# Patient Record
Sex: MALE | Race: WHITE | NOT HISPANIC OR LATINO | Employment: FULL TIME | URBAN - NONMETROPOLITAN AREA
[De-identification: names, ages, dates, MRNs, and addresses within clinical notes are randomized per-mention and may not be internally consistent; named-entity substitution may affect disease eponyms.]

---

## 2023-05-18 ENCOUNTER — OFFICE VISIT (OUTPATIENT)
Dept: PRIMARY CARE | Facility: CLINIC | Age: 48
End: 2023-05-18
Payer: COMMERCIAL

## 2023-05-18 VITALS
SYSTOLIC BLOOD PRESSURE: 146 MMHG | RESPIRATION RATE: 18 BRPM | HEART RATE: 102 BPM | WEIGHT: 272 LBS | HEIGHT: 73 IN | BODY MASS INDEX: 36.05 KG/M2 | OXYGEN SATURATION: 96 % | DIASTOLIC BLOOD PRESSURE: 82 MMHG

## 2023-05-18 DIAGNOSIS — H66.90 ACUTE OTITIS MEDIA, UNSPECIFIED OTITIS MEDIA TYPE: Primary | ICD-10-CM

## 2023-05-18 PROCEDURE — 99212 OFFICE O/P EST SF 10 MIN: CPT | Performed by: NURSE PRACTITIONER

## 2023-05-18 PROCEDURE — 1036F TOBACCO NON-USER: CPT | Performed by: NURSE PRACTITIONER

## 2023-05-18 RX ORDER — ACETAMINOPHEN 500 MG
1 TABLET ORAL DAILY
COMMUNITY
Start: 2017-11-10

## 2023-05-18 RX ORDER — AMLODIPINE BESYLATE 10 MG/1
10 TABLET ORAL DAILY
COMMUNITY
End: 2023-06-09 | Stop reason: SDUPTHER

## 2023-05-18 RX ORDER — CIPROFLOXACIN AND DEXAMETHASONE 3; 1 MG/ML; MG/ML
4 SUSPENSION/ DROPS AURICULAR (OTIC) 2 TIMES DAILY
Qty: 2.8 ML | Refills: 0 | Status: SHIPPED | OUTPATIENT
Start: 2023-05-18 | End: 2023-05-25

## 2023-05-18 RX ORDER — AZITHROMYCIN 250 MG/1
TABLET, FILM COATED ORAL
Qty: 6 TABLET | Refills: 0 | Status: SHIPPED | OUTPATIENT
Start: 2023-05-18 | End: 2023-05-23

## 2023-05-18 RX ORDER — LISINOPRIL AND HYDROCHLOROTHIAZIDE 20; 25 MG/1; MG/1
1 TABLET ORAL DAILY
COMMUNITY
End: 2023-06-09 | Stop reason: SDUPTHER

## 2023-05-18 NOTE — PROGRESS NOTES
"Subjective   Patient ID: Jeyson Wen is a 48 y.o. male who presents for Earache (R ear x4 d ).    Started to get RT ear pain and pressure. Noted a hard sore area just under the RT ear \"on the jaw bone. Afebrile. Non smoker. No other symptoms    Earache          Review of Systems   HENT:  Positive for ear pain.    All other systems reviewed and are negative.      Objective   /82   Pulse 102   Resp 18   Ht 1.854 m (6' 1\")   Wt 123 kg (272 lb)   SpO2 96%   BMI 35.89 kg/m²     Physical Exam  Vitals reviewed.   HENT:      Head: Normocephalic and atraumatic.      Right Ear: Decreased hearing noted. Swelling and tenderness present. A middle ear effusion is present. There is mastoid tenderness.      Left Ear: No swelling or tenderness. A middle ear effusion is present.      Nose: No congestion.      Mouth/Throat:      Pharynx: No posterior oropharyngeal erythema.   Pulmonary:      Effort: Pulmonary effort is normal.      Breath sounds: Normal breath sounds.   Lymphadenopathy:      Cervical: Cervical adenopathy present.   Neurological:      Mental Status: He is alert.         Assessment/Plan        Jeyson was seen today for earache.  Diagnoses and all orders for this visit:  Acute otitis media, unspecified otitis media type (Primary)  Comments:  acute. start Zpak since alg to PCN. Cipro-dex 4 gtt BID x 7 days. call me if the painful LN does not resolve after atbx TX  Orders:  -     azithromycin (Zithromax) 250 mg tablet; Take 2 tablets (500 mg) by mouth once daily for 1 day, THEN 1 tablet (250 mg) once daily for 4 days. Take 2 tabs (500 mg) by mouth today, than 1 daily for 4 days..  -     ciprofloxacin-dexamethasone (CiproDEX) otic suspension; Administer 4 drops into each ear 2 times a day for 7 days.     "

## 2023-06-08 ENCOUNTER — TELEPHONE (OUTPATIENT)
Dept: PRIMARY CARE | Facility: CLINIC | Age: 48
End: 2023-06-08

## 2023-06-08 DIAGNOSIS — I15.9 SECONDARY HYPERTENSION: Primary | ICD-10-CM

## 2023-06-09 RX ORDER — LISINOPRIL AND HYDROCHLOROTHIAZIDE 20; 25 MG/1; MG/1
1 TABLET ORAL DAILY
Qty: 30 TABLET | Refills: 5 | Status: SHIPPED | OUTPATIENT
Start: 2023-06-09 | End: 2024-01-03 | Stop reason: SDUPTHER

## 2023-06-09 RX ORDER — AMLODIPINE BESYLATE 10 MG/1
10 TABLET ORAL DAILY
Qty: 30 TABLET | Refills: 5 | Status: SHIPPED | OUTPATIENT
Start: 2023-06-09 | End: 2024-01-12 | Stop reason: SDUPTHER

## 2023-07-03 ENCOUNTER — TELEPHONE (OUTPATIENT)
Dept: PRIMARY CARE | Facility: CLINIC | Age: 48
End: 2023-07-03
Payer: COMMERCIAL

## 2023-11-02 ENCOUNTER — APPOINTMENT (OUTPATIENT)
Dept: CARDIOLOGY | Facility: HOSPITAL | Age: 48
End: 2023-11-02
Payer: COMMERCIAL

## 2023-12-11 ASSESSMENT — ENCOUNTER SYMPTOMS
COUGH: 1
SORE THROAT: 1
NECK PAIN: 0
TROUBLE SWALLOWING: 0
SHORTNESS OF BREATH: 0
STRIDOR: 1
SWOLLEN GLANDS: 0
HOARSE VOICE: 1
ABDOMINAL PAIN: 0
VOMITING: 0
DIARRHEA: 0
HEADACHES: 0

## 2023-12-13 ENCOUNTER — OFFICE VISIT (OUTPATIENT)
Dept: PRIMARY CARE | Facility: CLINIC | Age: 48
End: 2023-12-13
Payer: COMMERCIAL

## 2023-12-13 ENCOUNTER — ANCILLARY PROCEDURE (OUTPATIENT)
Dept: RADIOLOGY | Facility: CLINIC | Age: 48
End: 2023-12-13
Payer: COMMERCIAL

## 2023-12-13 VITALS
TEMPERATURE: 97.8 F | SYSTOLIC BLOOD PRESSURE: 150 MMHG | WEIGHT: 260 LBS | OXYGEN SATURATION: 93 % | HEART RATE: 125 BPM | DIASTOLIC BLOOD PRESSURE: 90 MMHG | BODY MASS INDEX: 34.3 KG/M2

## 2023-12-13 DIAGNOSIS — J18.9 COMMUNITY ACQUIRED PNEUMONIA, UNSPECIFIED LATERALITY: Primary | ICD-10-CM

## 2023-12-13 DIAGNOSIS — J18.9 COMMUNITY ACQUIRED PNEUMONIA, UNSPECIFIED LATERALITY: ICD-10-CM

## 2023-12-13 PROBLEM — F41.9 ANXIETY: Status: ACTIVE | Noted: 2023-12-13

## 2023-12-13 PROBLEM — E55.9 VITAMIN D DEFICIENCY: Status: ACTIVE | Noted: 2023-12-13

## 2023-12-13 PROBLEM — K76.0 FATTY LIVER: Status: ACTIVE | Noted: 2023-12-13

## 2023-12-13 PROBLEM — M71.9 BURSITIS: Status: ACTIVE | Noted: 2023-12-13

## 2023-12-13 PROBLEM — M16.11 PRIMARY OSTEOARTHRITIS OF RIGHT HIP: Status: ACTIVE | Noted: 2017-01-10

## 2023-12-13 PROBLEM — R73.01 IFG (IMPAIRED FASTING GLUCOSE): Status: ACTIVE | Noted: 2023-12-13

## 2023-12-13 PROBLEM — Z96.649 S/P HIP REPLACEMENT: Status: ACTIVE | Noted: 2017-01-27

## 2023-12-13 PROBLEM — E66.9 OBESITY: Status: ACTIVE | Noted: 2017-01-10

## 2023-12-13 PROBLEM — R19.5 POSITIVE COLORECTAL CANCER SCREENING USING COLOGUARD TEST: Status: ACTIVE | Noted: 2023-12-13

## 2023-12-13 PROBLEM — Z96.642 HISTORY OF LEFT HIP REPLACEMENT: Status: ACTIVE | Noted: 2017-01-10

## 2023-12-13 PROBLEM — E78.5 HYPERLIPIDEMIA: Status: ACTIVE | Noted: 2023-12-13

## 2023-12-13 PROCEDURE — 1036F TOBACCO NON-USER: CPT

## 2023-12-13 PROCEDURE — 71046 X-RAY EXAM CHEST 2 VIEWS: CPT | Performed by: RADIOLOGY

## 2023-12-13 PROCEDURE — 71046 X-RAY EXAM CHEST 2 VIEWS: CPT

## 2023-12-13 PROCEDURE — 3080F DIAST BP >= 90 MM HG: CPT

## 2023-12-13 PROCEDURE — 99214 OFFICE O/P EST MOD 30 MIN: CPT

## 2023-12-13 PROCEDURE — 3077F SYST BP >= 140 MM HG: CPT

## 2023-12-13 RX ORDER — BENZONATATE 200 MG/1
200 CAPSULE ORAL 3 TIMES DAILY PRN
Qty: 30 CAPSULE | Refills: 0 | Status: SHIPPED | OUTPATIENT
Start: 2023-12-13 | End: 2023-12-23

## 2023-12-13 RX ORDER — AZITHROMYCIN 250 MG/1
TABLET, FILM COATED ORAL
Qty: 6 TABLET | Refills: 0 | Status: SHIPPED | OUTPATIENT
Start: 2023-12-13 | End: 2023-12-18

## 2023-12-13 RX ORDER — ROSUVASTATIN CALCIUM 5 MG/1
5 TABLET, COATED ORAL DAILY
COMMUNITY
Start: 2023-11-24 | End: 2023-12-27

## 2023-12-13 RX ORDER — ASPIRIN 81 MG/1
81 TABLET ORAL DAILY
COMMUNITY

## 2023-12-13 ASSESSMENT — ENCOUNTER SYMPTOMS
COUGH: 1
TROUBLE SWALLOWING: 0
OCCASIONAL FEELINGS OF UNSTEADINESS: 0
SORE THROAT: 1
SWOLLEN GLANDS: 0
NECK PAIN: 0
STRIDOR: 1
DIARRHEA: 0
DEPRESSION: 0
SHORTNESS OF BREATH: 0
HOARSE VOICE: 1
VOMITING: 0
LOSS OF SENSATION IN FEET: 0
ABDOMINAL PAIN: 0
HEADACHES: 0

## 2023-12-13 ASSESSMENT — SOCIAL DETERMINANTS OF HEALTH (SDOH)

## 2023-12-13 NOTE — PROGRESS NOTES
Subjective   Patient ID: Jeyson Wen is a 48 y.o. male who presents for Follow-up (Jeyson is here for an Urgent care follow up. Still has a cough and congestion. Blood pressure has been elevated due to the cold medication. Has been on Doxycyline for the past 5 days. ).  Patient presents for congestion, cough for a few weeks, and to establish care  Previously seen by Urgent care, he was started on 10 day dosing of Doxycycline, which patient finished full course, remains ill    Lungs with crackles bilaterally, differential CAP vs bronchitis  Start Azithro and obtain CXR    CHECKS BP DAILY  150S SYSTOLIC  TAKING MUCINEX AND CLARITIN  ON DAY 5 OF DOXY    NO FEVER, PRODUCTIVE COUGH WITH PURULENT SPUTUM  FATIGUE, NOT SLEEPING AT NIGHT    Sore Throat   This is a new problem. The current episode started 1 to 4 weeks ago. The problem has been unchanged. Neither side of throat is experiencing more pain than the other. There has been no fever. The pain is at a severity of 3/10. Associated symptoms include congestion, coughing, a hoarse voice, a plugged ear sensation and stridor. Pertinent negatives include no abdominal pain, diarrhea, drooling, ear discharge, ear pain, headaches, neck pain, shortness of breath, swollen glands, trouble swallowing or vomiting. He has had no exposure to strep or mono.       Vitals:    12/13/23 1012   BP: 150/90   Pulse: (!) 125   Temp: 36.6 °C (97.8 °F)   SpO2: 93%       Review of Systems   HENT:  Positive for congestion, hoarse voice and sore throat. Negative for drooling, ear discharge, ear pain and trouble swallowing.    Respiratory:  Positive for cough and stridor. Negative for shortness of breath.    Gastrointestinal:  Negative for abdominal pain, diarrhea and vomiting.   Musculoskeletal:  Negative for neck pain.   Neurological:  Negative for headaches.       Objective   Physical Exam  Vitals and nursing note reviewed.   Constitutional:       Appearance: He is ill-appearing.   Pulmonary:       Breath sounds: Wheezing, rhonchi and rales present.   Neurological:      Mental Status: He is alert.         Assessment/Plan   Problem List Items Addressed This Visit    None  Visit Diagnoses       Community acquired pneumonia, unspecified laterality    -  Primary    Relevant Medications    benzonatate (Tessalon) 200 mg capsule    azithromycin (Zithromax) 250 mg tablet    Other Relevant Orders    XR chest 2 views                 Thank you for coming in today, please call my office if you have any concerns or questions.     Dick LOPEZ, CNP

## 2023-12-15 ENCOUNTER — TELEPHONE (OUTPATIENT)
Dept: PRIMARY CARE | Facility: CLINIC | Age: 48
End: 2023-12-15
Payer: COMMERCIAL

## 2023-12-15 NOTE — TELEPHONE ENCOUNTER
----- Message from ROSSANA Winn sent at 12/15/2023  8:28 AM EST -----  Atypical vs viral pneumonia    Continue azithromycin and cough medicine. Notify the office if no improvement by Monday.

## 2023-12-15 NOTE — RESULT ENCOUNTER NOTE
Atypical vs viral pneumonia    Continue azithromycin and cough medicine. Notify the office if no improvement by Monday.

## 2023-12-23 DIAGNOSIS — E78.5 HYPERLIPIDEMIA, UNSPECIFIED HYPERLIPIDEMIA TYPE: ICD-10-CM

## 2023-12-27 RX ORDER — ROSUVASTATIN CALCIUM 5 MG/1
5 TABLET, COATED ORAL DAILY
Qty: 90 TABLET | Refills: 3 | Status: SHIPPED | OUTPATIENT
Start: 2023-12-27 | End: 2024-12-26

## 2024-01-03 ENCOUNTER — OFFICE VISIT (OUTPATIENT)
Dept: PRIMARY CARE | Facility: CLINIC | Age: 49
End: 2024-01-03
Payer: COMMERCIAL

## 2024-01-03 VITALS
OXYGEN SATURATION: 94 % | HEART RATE: 82 BPM | BODY MASS INDEX: 34.7 KG/M2 | WEIGHT: 263 LBS | SYSTOLIC BLOOD PRESSURE: 160 MMHG | DIASTOLIC BLOOD PRESSURE: 110 MMHG | TEMPERATURE: 97.3 F

## 2024-01-03 DIAGNOSIS — J18.9 COMMUNITY ACQUIRED PNEUMONIA, UNSPECIFIED LATERALITY: Primary | ICD-10-CM

## 2024-01-03 DIAGNOSIS — I15.9 SECONDARY HYPERTENSION: ICD-10-CM

## 2024-01-03 PROCEDURE — 3080F DIAST BP >= 90 MM HG: CPT

## 2024-01-03 PROCEDURE — 3077F SYST BP >= 140 MM HG: CPT

## 2024-01-03 PROCEDURE — 99214 OFFICE O/P EST MOD 30 MIN: CPT

## 2024-01-03 PROCEDURE — 1036F TOBACCO NON-USER: CPT

## 2024-01-03 RX ORDER — METHYLPREDNISOLONE 4 MG/1
TABLET ORAL
Qty: 21 TABLET | Refills: 0 | Status: SHIPPED | OUTPATIENT
Start: 2024-01-03 | End: 2024-01-10

## 2024-01-03 RX ORDER — ALBUTEROL SULFATE 90 UG/1
2 AEROSOL, METERED RESPIRATORY (INHALATION) EVERY 4 HOURS PRN
Qty: 8 G | Refills: 5 | Status: SHIPPED | OUTPATIENT
Start: 2024-01-03 | End: 2024-01-29 | Stop reason: SDUPTHER

## 2024-01-03 RX ORDER — LISINOPRIL AND HYDROCHLOROTHIAZIDE 20; 25 MG/1; MG/1
1 TABLET ORAL DAILY
Qty: 90 TABLET | Refills: 1 | Status: SHIPPED | OUTPATIENT
Start: 2024-01-03 | End: 2024-07-01

## 2024-01-03 RX ORDER — BENZONATATE 200 MG/1
200 CAPSULE ORAL 3 TIMES DAILY PRN
Qty: 30 CAPSULE | Refills: 0 | Status: SHIPPED | OUTPATIENT
Start: 2024-01-03 | End: 2024-01-13

## 2024-01-03 ASSESSMENT — ENCOUNTER SYMPTOMS
ACTIVITY CHANGE: 0
WHEEZING: 1
SHORTNESS OF BREATH: 0
COUGH: 1

## 2024-01-03 NOTE — PROGRESS NOTES
Subjective   Patient ID: Jeyson Wen is a 48 y.o. male who presents for Follow-up (Jeyson is here for a follow up of pneumonia. Does still have a cough and has phlegm sometimes, is like a yellow green. ).  Subjective  Jeyson Wen is a 48 y.o. male who presents for follow up on pneumonia. Symptoms include: pleuritic chest pain and coughing with wheezing. Symptoms began a few week ago, and have been gradually improving since that time. Patient denies chest pain. Treatment thus far includes: oral antibiotics per medication orders: somewhat effective. Past pulmonary history is significant for occasional episodes of bronchitis.    Objective  Oxygen saturation 94% on room air  [unfilled]     Assessment/Plan  Pneumonia.    Follow up as needed.  Add steroid and beta agonist inhaler        Vitals:    01/03/24 0903   BP: (!) 160/110   Pulse: 82   Temp: 36.3 °C (97.3 °F)   SpO2: 94%       Review of Systems   Constitutional:  Negative for activity change.   Respiratory:  Positive for cough and wheezing. Negative for shortness of breath.        Objective   Physical Exam  Vitals and nursing note reviewed.   Constitutional:       Appearance: Normal appearance.   HENT:      Head: Normocephalic.      Mouth/Throat:      Mouth: Mucous membranes are moist.   Cardiovascular:      Rate and Rhythm: Normal rate and regular rhythm.      Pulses: Normal pulses.      Heart sounds: Normal heart sounds. No murmur heard.     No friction rub. No gallop.   Pulmonary:      Effort: Pulmonary effort is normal. No respiratory distress.      Breath sounds: Wheezing present.      Comments: Small amount of expiratory wheeze  Abdominal:      General: Bowel sounds are normal. There is no distension.      Palpations: Abdomen is soft.      Tenderness: There is no abdominal tenderness.   Musculoskeletal:         General: No deformity. Normal range of motion.   Skin:     General: Skin is warm and dry.      Capillary Refill: Capillary refill takes less than  2 seconds.   Neurological:      General: No focal deficit present.      Mental Status: He is alert and oriented to person, place, and time.   Psychiatric:         Mood and Affect: Mood normal.         Assessment/Plan   Problem List Items Addressed This Visit    None  Visit Diagnoses       Community acquired pneumonia, unspecified laterality    -  Primary    Relevant Medications    methylPREDNISolone (Medrol Dospak) 4 mg tablets    albuterol (Ventolin HFA) 90 mcg/actuation inhaler    benzonatate (Tessalon) 200 mg capsule    Secondary hypertension        Relevant Medications    lisinopriL-hydrochlorothiazide 20-25 mg tablet                 Thank you for coming in today, please call my office if you have any concerns or questions.     Dick LOPEZ, CNP

## 2024-01-12 DIAGNOSIS — I15.9 SECONDARY HYPERTENSION: ICD-10-CM

## 2024-01-12 RX ORDER — AMLODIPINE BESYLATE 10 MG/1
10 TABLET ORAL DAILY
Qty: 30 TABLET | Refills: 5 | Status: SHIPPED | OUTPATIENT
Start: 2024-01-12 | End: 2024-01-30 | Stop reason: SDUPTHER

## 2024-01-29 ENCOUNTER — OFFICE VISIT (OUTPATIENT)
Dept: PRIMARY CARE | Facility: CLINIC | Age: 49
End: 2024-01-29
Payer: COMMERCIAL

## 2024-01-29 VITALS
TEMPERATURE: 97.5 F | BODY MASS INDEX: 34.96 KG/M2 | DIASTOLIC BLOOD PRESSURE: 90 MMHG | HEART RATE: 105 BPM | WEIGHT: 265 LBS | SYSTOLIC BLOOD PRESSURE: 148 MMHG | OXYGEN SATURATION: 95 %

## 2024-01-29 DIAGNOSIS — J18.9 COMMUNITY ACQUIRED PNEUMONIA, UNSPECIFIED LATERALITY: Primary | ICD-10-CM

## 2024-01-29 DIAGNOSIS — R06.2 WHEEZING: ICD-10-CM

## 2024-01-29 PROCEDURE — 99214 OFFICE O/P EST MOD 30 MIN: CPT

## 2024-01-29 PROCEDURE — 3077F SYST BP >= 140 MM HG: CPT

## 2024-01-29 PROCEDURE — 3080F DIAST BP >= 90 MM HG: CPT

## 2024-01-29 PROCEDURE — 1036F TOBACCO NON-USER: CPT

## 2024-01-29 RX ORDER — FLUTICASONE PROPIONATE 110 UG/1
1 AEROSOL, METERED RESPIRATORY (INHALATION)
Qty: 12 G | Refills: 5 | Status: SHIPPED | OUTPATIENT
Start: 2024-01-29 | End: 2024-01-30

## 2024-01-29 RX ORDER — ALBUTEROL SULFATE 90 UG/1
2 AEROSOL, METERED RESPIRATORY (INHALATION) EVERY 4 HOURS PRN
Qty: 8 G | Refills: 2 | Status: SHIPPED | OUTPATIENT
Start: 2024-01-29 | End: 2024-02-29 | Stop reason: SDUPTHER

## 2024-01-29 RX ORDER — LEVOFLOXACIN 500 MG/1
500 TABLET, FILM COATED ORAL DAILY
Qty: 7 TABLET | Refills: 0 | Status: SHIPPED | OUTPATIENT
Start: 2024-01-29 | End: 2024-02-05

## 2024-01-29 NOTE — PATIENT INSTRUCTIONS
Start Levaquin once daily for 7 days  Flovent steroid inhaler    Albuterol refill sent.   Thank you for coming in today, if any questions or concerns arise, please call my office.   JESSICA Winn-CNP

## 2024-01-29 NOTE — PROGRESS NOTES
Subjective   Patient ID: Jeyson Wen is a 48 y.o. male who presents for Follow-up (Jeyson is here for a follow up of pneumonia. He is still coughing a lot and wheezing. Did get better then came back again. ).  Subjective  Jeyson Wen is a 48 y.o. male who presents for follow up on pneumonia. Symptoms include: cough, sputum production, and wheezing. Symptoms began several weeks ago, and have been stabilized, worsened since that time. Patient denies dyspnea, chest pain, weight loss, or nausea and vomiting. Treatment thus far includes: abx, steroid, albuterol. Past pulmonary history is significant for no history of pneumonia or bronchitis.    Objective  Oxygen saturation 95% on room air  [unfilled]     Assessment/Plan  Community acquired pneumonia.    The diagnosis was discussed along with the usual course and treatment.  Educational material distributed.  Restarted antibiotics per medication orders.  OTC analgesics/antipyretics as needed.  Follow up as needed.          Vitals:    01/29/24 1512   BP: 148/90   Pulse: 105   Temp: 36.4 °C (97.5 °F)   SpO2: 95%       Review of Systems    Objective   Physical Exam    Assessment/Plan   Problem List Items Addressed This Visit    None  Visit Diagnoses       Community acquired pneumonia, unspecified laterality    -  Primary    Relevant Medications    levoFLOXacin (Levaquin) 500 mg tablet    albuterol (Ventolin HFA) 90 mcg/actuation inhaler    Other Relevant Orders    XR chest 2 views    Wheezing        Relevant Medications    fluticasone (Flovent) 110 mcg/actuation inhaler                 Thank you for coming in today, please call my office if you have any concerns or questions.     Dick LOPEZ, CNP

## 2024-01-30 ENCOUNTER — HOSPITAL ENCOUNTER (OUTPATIENT)
Dept: RADIOLOGY | Facility: CLINIC | Age: 49
Discharge: HOME | End: 2024-01-30
Payer: COMMERCIAL

## 2024-01-30 DIAGNOSIS — I15.9 SECONDARY HYPERTENSION: ICD-10-CM

## 2024-01-30 DIAGNOSIS — J18.9 COMMUNITY ACQUIRED PNEUMONIA, UNSPECIFIED LATERALITY: ICD-10-CM

## 2024-01-30 DIAGNOSIS — R06.2 WHEEZING: Primary | ICD-10-CM

## 2024-01-30 PROCEDURE — 71046 X-RAY EXAM CHEST 2 VIEWS: CPT

## 2024-01-30 PROCEDURE — 71046 X-RAY EXAM CHEST 2 VIEWS: CPT | Performed by: RADIOLOGY

## 2024-01-30 RX ORDER — BUDESONIDE 180 UG/1
1 AEROSOL, POWDER RESPIRATORY (INHALATION)
Qty: 1 EACH | Refills: 11 | Status: SHIPPED | OUTPATIENT
Start: 2024-01-30 | End: 2025-01-29

## 2024-01-30 RX ORDER — AMLODIPINE BESYLATE 10 MG/1
10 TABLET ORAL DAILY
Qty: 30 TABLET | Refills: 5 | Status: CANCELLED | OUTPATIENT
Start: 2024-01-30

## 2024-01-30 RX ORDER — AMLODIPINE BESYLATE 10 MG/1
10 TABLET ORAL DAILY
Qty: 90 TABLET | Refills: 1 | Status: SHIPPED | OUTPATIENT
Start: 2024-01-30 | End: 2024-02-29 | Stop reason: SDUPTHER

## 2024-01-30 NOTE — LETTER
February 1, 2024     Jeyson Vázqueznasir  [address]  [facility] [address]      Dear Mr. Wen:    Below are the results from your recent visit:    Resulted Orders   XR chest 2 views    Narrative    Interpreted By:  Scott Monte,   STUDY:  XR CHEST 2 VIEWS;  1/30/2024 3:19 pm      INDICATION:  Signs/Symptoms:continued wheezing, pneumonia follow up.      COMPARISON:  12/13/2023      ACCESSION NUMBER(S):  [id number]      ORDERING CLINICIAN:  MARIA E BELL      FINDINGS:  CHEST/LUNGS:  The cardiac and mediastinal silhouettes are unchanged in size and  configuration. No focal areas of consolidation are noted. No effusion  or pneumothorax is seen.      UPPER ABDOMEN:  No remarkable upper abdominal findings.      OSSEOUS STRUCTURES:  No acute changes.        Impression    No radiographic evidence of an acute cardiopulmonary process.      MACRO:  None.      Signed by: Scott Monte 1/31/2024 6:07 PM  Dictation workstation:   [id number]     The test results came back negative. Please continue current treatment plan as discussed at appointment.     If you have any questions or concerns, please don't hesitate to call.         Sincerely,    KATIA Castillo

## 2024-02-01 ENCOUNTER — TELEPHONE (OUTPATIENT)
Dept: PRIMARY CARE | Facility: CLINIC | Age: 49
End: 2024-02-01
Payer: COMMERCIAL

## 2024-02-01 NOTE — TELEPHONE ENCOUNTER
----- Message from JESSICA Winn-CNP sent at 2/1/2024  8:05 AM EST -----  Results are normal, please notify the patient. No signs of further pneumonia. Continue therapy as prescribed. Thanks

## 2024-02-01 NOTE — RESULT ENCOUNTER NOTE
Results are normal, please notify the patient. No signs of further pneumonia. Continue therapy as prescribed. Thanks

## 2024-02-29 ENCOUNTER — OFFICE VISIT (OUTPATIENT)
Dept: PRIMARY CARE | Facility: CLINIC | Age: 49
End: 2024-02-29
Payer: COMMERCIAL

## 2024-02-29 VITALS
DIASTOLIC BLOOD PRESSURE: 88 MMHG | HEART RATE: 93 BPM | OXYGEN SATURATION: 97 % | SYSTOLIC BLOOD PRESSURE: 148 MMHG | TEMPERATURE: 96.9 F | BODY MASS INDEX: 35.36 KG/M2 | WEIGHT: 268 LBS

## 2024-02-29 DIAGNOSIS — J18.9 COMMUNITY ACQUIRED PNEUMONIA, UNSPECIFIED LATERALITY: ICD-10-CM

## 2024-02-29 DIAGNOSIS — I15.9 SECONDARY HYPERTENSION: ICD-10-CM

## 2024-02-29 DIAGNOSIS — R06.2 WHEEZING: Primary | ICD-10-CM

## 2024-02-29 DIAGNOSIS — R29.818 SUSPECTED SLEEP APNEA: ICD-10-CM

## 2024-02-29 PROCEDURE — 1036F TOBACCO NON-USER: CPT

## 2024-02-29 PROCEDURE — 3077F SYST BP >= 140 MM HG: CPT

## 2024-02-29 PROCEDURE — 99213 OFFICE O/P EST LOW 20 MIN: CPT

## 2024-02-29 PROCEDURE — 3079F DIAST BP 80-89 MM HG: CPT

## 2024-02-29 RX ORDER — AMLODIPINE BESYLATE 10 MG/1
10 TABLET ORAL DAILY
Qty: 90 TABLET | Refills: 1 | Status: SHIPPED | OUTPATIENT
Start: 2024-02-29 | End: 2024-08-27

## 2024-02-29 RX ORDER — ALBUTEROL SULFATE 90 UG/1
2 AEROSOL, METERED RESPIRATORY (INHALATION) EVERY 4 HOURS PRN
Qty: 8 G | Refills: 2 | Status: SHIPPED | OUTPATIENT
Start: 2024-02-29 | End: 2025-02-28

## 2024-02-29 ASSESSMENT — ENCOUNTER SYMPTOMS
COUGH: 1
WHEEZING: 1
SHORTNESS OF BREATH: 0

## 2024-02-29 NOTE — PROGRESS NOTES
Subjective   Patient ID: Jeyson Wen is a 48 y.o. male who presents for Follow-up (Jeyson is here for a follow up of PNX. Is still having some wheezing. Needs refills sent to new pharmacy).    Wheezing  --Exercise not affected, Elliptical about 30 minutes, treadmill 25 minutes.   --Wheezing at night when lying for bed, takes albuterol before bed    See Johanna Reddy  Snores at night.     --PFT and Sleep study        Vitals:    02/29/24 1435   BP: 148/88   Pulse: 93   Temp: 36.1 °C (96.9 °F)   SpO2: 97%       Review of Systems   Respiratory:  Positive for cough and wheezing. Negative for shortness of breath.    All other systems reviewed and are negative.      Objective   Physical Exam  Vitals and nursing note reviewed.   Pulmonary:      Breath sounds: No wheezing.         Assessment/Plan   Problem List Items Addressed This Visit    None  Visit Diagnoses       Wheezing    -  Primary    Relevant Orders    Referral to Pulmonology    Complete Pulmonary Function Test Pre/Post Bronchodialator (Spirometry Pre/Post/DLCO/Lung Volumes)    Community acquired pneumonia, unspecified laterality        Relevant Medications    albuterol (Ventolin HFA) 90 mcg/actuation inhaler    Secondary hypertension        Relevant Medications    amLODIPine (Norvasc) 10 mg tablet    Suspected sleep apnea        Relevant Orders    In-Center Sleep Study (Non-Sleep Provider Only)                 Thank you for coming in today, please call my office if you have any concerns or questions.     Dick LOPEZ, CNP

## 2024-03-03 ENCOUNTER — APPOINTMENT (OUTPATIENT)
Dept: CARDIOLOGY | Facility: HOSPITAL | Age: 49
End: 2024-03-03
Payer: COMMERCIAL

## 2024-03-03 ENCOUNTER — HOSPITAL ENCOUNTER (EMERGENCY)
Facility: HOSPITAL | Age: 49
Discharge: HOME | End: 2024-03-04
Attending: STUDENT IN AN ORGANIZED HEALTH CARE EDUCATION/TRAINING PROGRAM
Payer: COMMERCIAL

## 2024-03-03 DIAGNOSIS — I10 HYPERTENSION, UNSPECIFIED TYPE: Primary | ICD-10-CM

## 2024-03-03 PROCEDURE — 93005 ELECTROCARDIOGRAM TRACING: CPT

## 2024-03-03 PROCEDURE — 99284 EMERGENCY DEPT VISIT MOD MDM: CPT | Mod: 25

## 2024-03-03 ASSESSMENT — PAIN SCALES - GENERAL: PAINLEVEL_OUTOF10: 0 - NO PAIN

## 2024-03-03 ASSESSMENT — COLUMBIA-SUICIDE SEVERITY RATING SCALE - C-SSRS
6. HAVE YOU EVER DONE ANYTHING, STARTED TO DO ANYTHING, OR PREPARED TO DO ANYTHING TO END YOUR LIFE?: NO
2. HAVE YOU ACTUALLY HAD ANY THOUGHTS OF KILLING YOURSELF?: NO
1. IN THE PAST MONTH, HAVE YOU WISHED YOU WERE DEAD OR WISHED YOU COULD GO TO SLEEP AND NOT WAKE UP?: NO

## 2024-03-03 ASSESSMENT — PAIN - FUNCTIONAL ASSESSMENT: PAIN_FUNCTIONAL_ASSESSMENT: 0-10

## 2024-03-04 ENCOUNTER — HOSPITAL ENCOUNTER (OUTPATIENT)
Dept: RESPIRATORY THERAPY | Facility: HOSPITAL | Age: 49
End: 2024-03-04
Payer: COMMERCIAL

## 2024-03-04 ENCOUNTER — OFFICE VISIT (OUTPATIENT)
Dept: PRIMARY CARE | Facility: CLINIC | Age: 49
End: 2024-03-04
Payer: COMMERCIAL

## 2024-03-04 ENCOUNTER — HOSPITAL ENCOUNTER (EMERGENCY)
Facility: HOSPITAL | Age: 49
Discharge: HOME | End: 2024-03-04
Attending: EMERGENCY MEDICINE
Payer: COMMERCIAL

## 2024-03-04 VITALS
SYSTOLIC BLOOD PRESSURE: 166 MMHG | BODY MASS INDEX: 35.89 KG/M2 | DIASTOLIC BLOOD PRESSURE: 101 MMHG | HEART RATE: 84 BPM | RESPIRATION RATE: 16 BRPM | WEIGHT: 265 LBS | TEMPERATURE: 97.2 F | OXYGEN SATURATION: 96 % | HEIGHT: 72 IN

## 2024-03-04 VITALS
WEIGHT: 265 LBS | HEART RATE: 102 BPM | BODY MASS INDEX: 35.94 KG/M2 | DIASTOLIC BLOOD PRESSURE: 110 MMHG | SYSTOLIC BLOOD PRESSURE: 170 MMHG | OXYGEN SATURATION: 95 % | TEMPERATURE: 97.5 F

## 2024-03-04 VITALS
HEART RATE: 70 BPM | HEIGHT: 72 IN | WEIGHT: 265 LBS | DIASTOLIC BLOOD PRESSURE: 100 MMHG | SYSTOLIC BLOOD PRESSURE: 147 MMHG | OXYGEN SATURATION: 95 % | TEMPERATURE: 98.2 F | BODY MASS INDEX: 35.89 KG/M2 | RESPIRATION RATE: 16 BRPM

## 2024-03-04 DIAGNOSIS — I10 ASYMPTOMATIC HYPERTENSION: Primary | ICD-10-CM

## 2024-03-04 DIAGNOSIS — I10 BENIGN ESSENTIAL HYPERTENSION: Primary | ICD-10-CM

## 2024-03-04 DIAGNOSIS — F41.9 ANXIETY: ICD-10-CM

## 2024-03-04 PROCEDURE — 2500000004 HC RX 250 GENERAL PHARMACY W/ HCPCS (ALT 636 FOR OP/ED): Performed by: STUDENT IN AN ORGANIZED HEALTH CARE EDUCATION/TRAINING PROGRAM

## 2024-03-04 PROCEDURE — 2500000001 HC RX 250 WO HCPCS SELF ADMINISTERED DRUGS (ALT 637 FOR MEDICARE OP)

## 2024-03-04 PROCEDURE — 99283 EMERGENCY DEPT VISIT LOW MDM: CPT

## 2024-03-04 PROCEDURE — 99213 OFFICE O/P EST LOW 20 MIN: CPT

## 2024-03-04 PROCEDURE — 3077F SYST BP >= 140 MM HG: CPT

## 2024-03-04 PROCEDURE — 3080F DIAST BP >= 90 MM HG: CPT

## 2024-03-04 PROCEDURE — 96374 THER/PROPH/DIAG INJ IV PUSH: CPT

## 2024-03-04 PROCEDURE — 99284 EMERGENCY DEPT VISIT MOD MDM: CPT | Performed by: EMERGENCY MEDICINE

## 2024-03-04 PROCEDURE — 1036F TOBACCO NON-USER: CPT

## 2024-03-04 RX ORDER — LORAZEPAM 0.5 MG/1
0.5 TABLET ORAL 3 TIMES DAILY PRN
Qty: 30 TABLET | Refills: 0 | Status: SHIPPED | OUTPATIENT
Start: 2024-03-04 | End: 2024-03-14

## 2024-03-04 RX ORDER — LABETALOL HYDROCHLORIDE 5 MG/ML
10 INJECTION, SOLUTION INTRAVENOUS ONCE
Status: COMPLETED | OUTPATIENT
Start: 2024-03-04 | End: 2024-03-04

## 2024-03-04 RX ORDER — HYDROXYZINE HYDROCHLORIDE 25 MG/1
25 TABLET, FILM COATED ORAL ONCE
Status: DISCONTINUED | OUTPATIENT
Start: 2024-03-04 | End: 2024-03-04

## 2024-03-04 RX ORDER — HYDROXYZINE HYDROCHLORIDE 25 MG/1
25 TABLET, FILM COATED ORAL AS NEEDED
Qty: 12 TABLET | Refills: 0 | Status: SHIPPED | OUTPATIENT
Start: 2024-03-04 | End: 2024-03-04 | Stop reason: SDUPTHER

## 2024-03-04 RX ORDER — HYDROXYZINE HYDROCHLORIDE 25 MG/1
25 TABLET, FILM COATED ORAL EVERY 6 HOURS PRN
Qty: 90 TABLET | Refills: 0 | Status: SHIPPED | OUTPATIENT
Start: 2024-03-04 | End: 2024-04-01

## 2024-03-04 RX ORDER — HYDROXYZINE HYDROCHLORIDE 10 MG/1
10 TABLET, FILM COATED ORAL ONCE
Status: COMPLETED | OUTPATIENT
Start: 2024-03-04 | End: 2024-03-04

## 2024-03-04 RX ORDER — CARVEDILOL 6.25 MG/1
6.25 TABLET ORAL
Qty: 60 TABLET | Refills: 0 | Status: SHIPPED | OUTPATIENT
Start: 2024-03-04 | End: 2024-04-03 | Stop reason: SDUPTHER

## 2024-03-04 RX ADMIN — HYDROXYZINE HYDROCHLORIDE 10 MG: 10 TABLET ORAL at 12:29

## 2024-03-04 RX ADMIN — LABETALOL HYDROCHLORIDE 10 MG: 5 INJECTION, SOLUTION INTRAVENOUS at 00:56

## 2024-03-04 ASSESSMENT — COLUMBIA-SUICIDE SEVERITY RATING SCALE - C-SSRS
2. HAVE YOU ACTUALLY HAD ANY THOUGHTS OF KILLING YOURSELF?: NO
6. HAVE YOU EVER DONE ANYTHING, STARTED TO DO ANYTHING, OR PREPARED TO DO ANYTHING TO END YOUR LIFE?: NO
1. IN THE PAST MONTH, HAVE YOU WISHED YOU WERE DEAD OR WISHED YOU COULD GO TO SLEEP AND NOT WAKE UP?: NO

## 2024-03-04 ASSESSMENT — ENCOUNTER SYMPTOMS
ACTIVITY CHANGE: 0
SHORTNESS OF BREATH: 0
SLEEP DISTURBANCE: 0
PALPITATIONS: 0
STRIDOR: 0
WHEEZING: 0
NERVOUS/ANXIOUS: 1

## 2024-03-04 ASSESSMENT — PAIN - FUNCTIONAL ASSESSMENT: PAIN_FUNCTIONAL_ASSESSMENT: 0-10

## 2024-03-04 ASSESSMENT — PAIN SCALES - GENERAL: PAINLEVEL_OUTOF10: 0 - NO PAIN

## 2024-03-04 NOTE — ED TRIAGE NOTES
Patient presents to the Emergency department with a chief complaint of hypertension. Patient has history of hypetension, is medication compliant, and was seen yesterday for hypertension at [facility]. Patient states his blood pressure was still high at home this morining, denies any symptoms

## 2024-03-04 NOTE — PROGRESS NOTES
Subjective   Patient ID: Jeyson Wen is a 48 y.o. male who presents for Follow-up (Jeyson is here for an ER follow up. Went into the ER and was in the 180S/100S. Was given something for blood pressure and slowly did decrease until the morning. Went to ER this morning and was advised could just be stress. ).  Hypertension  Patient is here for follow-up of elevated blood pressure. He is exercising and is adherent to a low-salt diet. Blood pressure is not well controlled at home. Cardiac symptoms: none. Patient denies chest pain, chest pressure/discomfort, dyspnea, irregular heart beat, orthopnea, and paroxysmal nocturnal dyspnea. Cardiovascular risk factors: hypertension and male gender. Use of agents associated with hypertension: none. History of target organ damage: none.    No symptoms, doing well otherwise  Checks BP once every other day    ER twice for high BP  Does have warm sensation.     Coreg regimen started.     Wheezing has resolved since his last visit.        Vitals:    03/04/24 1554   BP: (!) 170/110   Pulse: 102   Temp: 36.4 °C (97.5 °F)   SpO2: 95%       Review of Systems   Constitutional:  Negative for activity change.   Respiratory:  Negative for shortness of breath, wheezing and stridor.    Cardiovascular:  Negative for chest pain, palpitations and leg swelling.   Psychiatric/Behavioral:  Negative for sleep disturbance. The patient is nervous/anxious.    All other systems reviewed and are negative.      Objective   Physical Exam  Vitals and nursing note reviewed.   Constitutional:       Appearance: Normal appearance.   HENT:      Head: Normocephalic.      Mouth/Throat:      Mouth: Mucous membranes are moist.   Cardiovascular:      Rate and Rhythm: Normal rate and regular rhythm.      Pulses: Normal pulses.      Heart sounds: Normal heart sounds. No murmur heard.     No friction rub. No gallop.   Pulmonary:      Effort: Pulmonary effort is normal. No respiratory distress.      Breath sounds: Normal  breath sounds. No wheezing.   Abdominal:      General: Bowel sounds are normal. There is no distension.      Palpations: Abdomen is soft.      Tenderness: There is no abdominal tenderness.   Musculoskeletal:         General: No deformity. Normal range of motion.   Skin:     General: Skin is warm and dry.      Capillary Refill: Capillary refill takes less than 2 seconds.   Neurological:      General: No focal deficit present.      Mental Status: He is alert and oriented to person, place, and time.   Psychiatric:         Mood and Affect: Mood normal.         Assessment/Plan   Problem List Items Addressed This Visit       Benign essential hypertension - Primary    Relevant Medications    carvedilol (Coreg) 6.25 mg tablet    Anxiety    Relevant Medications    hydrOXYzine HCL (Atarax) 25 mg tablet    LORazepam (Ativan) 0.5 mg tablet            Thank you for coming in today, please call my office if you have any concerns or questions.     Dick LOPEZ, CNP

## 2024-03-04 NOTE — ED PROVIDER NOTES
History/Exam limitations: none  HPI was provided by patient    HPI:    Chief Complaint   Patient presents with    Hypertension     Running 180/100 at home 2 hrs ago.  Normally 130/80.        Jeyson Wen is a 48 y.o. male presents with chief complaint of hypertension.  Patient has a history of hypertension.  States at home 2 hours ago his blood pressure was running 180s over 100.  States it is normally 130/80.  Upon arrival here initial blood pressure is 185/101.  Presently asymptomatic.  No chest pain no cough wheeze or short of breath or numbness tingling or weakness.  No missed doses or recent changes in medication.  The wheezing at times but states he has had this since having pneumonia months ago and scheduled tomorrow to have pulmonary function test be performed.        ROS: All other review of systems are negative except as noted above and HPI or ROS.   CONSTITUTIONAL:       fever, chills  EYES:      Blurry vision, change in vision  ENT:       sore throat, congestion, rhinorrhea  CARDIOVASCULAR:       chest pain, palpitations, swelling  RESPIRATORY:       cough, wheeze, shortness of breath  GI:       nausea, vomiting, diarrhea, abdominal pain  GENITOURINARY:       dysuria, hematuria, frequency  MUSCULOSKELETAL:       deformity, neck pain  SKIN:       rash, lesion  NEUROLOGIC:       headache, numbness, focal weakness  ENDOCRINE:      weight loss, fatigue  NOTES: All systems reviewed, negative except as described above     Physical Exam:  GENERAL: Alert, cooperative,  in no acute distress.  HEAD: normocephalic, atraumatic  SKIN:  dry skin, no lesions.  EYES: PERRL, EOMs intact,  Conjunctiva pink with no erythema or exudates. No scleral icterus.    NECK: Supple, without meningismus. Trachea at midline. No lymphadenopathy.  PULMONARY: Clear bilaterally. No crackles, rhonchi, wheezing.  No respiratory distress.  No work of breathing.  CARDIAC: Regular rate and regular rhythm.  Pulses 2+ in radials and dorsal  pedal pulses bilaterally.  No murmur, rub, gallop.  No edema.  ABDOMEN: Soft, nontender, active bowel sounds.  No palpable organomegaly.  No rebound or guarding.  No CVA tenderness.  No pulsatile masses.  MUSCULOSKELETAL: Full range of motion throughout, no deformity.   NEUROLOGICAL: no focal neuro deficits.  Strength 5 out of 5 throughout bilateral upper and lower extremities neurovascular intact in bilateral upper and lower extremities  PSYCHIATRIC: Appropriate mood and affect. Calm.       MDM/ED COURSE:    The patient presented for evaluation hypertension.  Differential includes but not limited to ACS, medication noncompliance, stress, hypertensive emergency hypertensive urgency.  Asymptomatic here.  Initially was at 185/101 on reevaluation prior to anything administered was down to 160 systolic.  He was already given an IV in triage so was given labetalol IV.  EKG was reassuring.  Blood pressure improved.  He remained asymptomatic.  Plan will be for him to follow-up with his cardiologist and primary care physician on medication reconciliation.    Patient nontoxic-appearing on reexamination.  Vital signs are stable.   The patient and caregiver are in agreement with the plan and given instructions to follow up     I discussed the differential, results and plan with the patient and/or family/friend/caregiver if present. All questions answered.     I emphasized the importance of follow-up with the physician I referred them to in the timeframe recommended.  I explained reasons for the patient to return to the Emergency Department. Additional verbal discharge instructions were also given and discussed with the patient to supplement those generated by the EMR. We also discussed medications that were prescribed (if any) including common side effects and interactions. The patient was advised to abstain from driving, operating heavy machinery or making significant decisions while taking medications such as opiates and  muscle relaxers that may impair this. All questions were addressed.  They understand return precautions and discharge instructions. The patient and/or family/friend/caregiver expressed understanding.        Note: This note was dictated by speech recognition. Minor errors in transcription may be present.    ED Course as of 03/04/24 0114   Sun Mar 03, 2024   2321 EKG interpreted by me shows normal sinus rhythm.  No STEMI.  Rate 83 bpm.  Compared to prior EKG PVCs no longer present. [WL]      ED Course User Index  [WL] Lg Box DO         Diagnoses as of 03/04/24 0114   Hypertension, unspecified type         Past Medical History:   Diagnosis Date    Chronic rhinitis 11/10/2017    Rhinitis    Complete rotator cuff tear or rupture of right shoulder, not specified as traumatic 07/28/2021    Complete tear of right rotator cuff    Contusion of unspecified lesser toe(s) without damage to nail, initial encounter 02/12/2021    Toe contusion    Elevation of levels of liver transaminase levels 11/10/2017    Elevated transaminase level    Encounter for screening for cardiovascular disorders 06/20/2019    Screening for cardiovascular condition    Furuncle, unspecified 01/19/2022    Boil    Hypertension 1/2006    Nondisplaced fracture of first metatarsal bone, right foot, initial encounter for closed fracture 02/22/2021    Closed nondisplaced fracture of first metatarsal bone of right foot, initial encounter    Other abnormality of red blood cells 02/02/2018    Low mean corpuscular volume (MCV)    Pain in right hip 08/31/2016    Right hip pain    Pain in right shoulder 10/13/2021    Right shoulder pain, unspecified chronicity    Personal history of diseases of the skin and subcutaneous tissue 04/16/2019    History of dermatitis    Personal history of other diseases of male genital organs 07/03/2019    History of epididymitis    Personal history of other diseases of the circulatory system 11/21/2014    History of hypertension     Personal history of other diseases of the circulatory system 07/03/2019    History of varicocele    Personal history of other diseases of the digestive system 03/26/2020    History of irritable bowel syndrome    Personal history of other diseases of the nervous system and sense organs 06/25/2021    History of labyrinthitis    Personal history of other diseases of the respiratory system 12/20/2021    History of sinusitis    Personal history of other mental and behavioral disorders 11/21/2014    History of anxiety disorder    Pleurodynia 02/02/2015    Rib pain on left side    Psoriasis, unspecified 06/20/2019    Psoriasis    Pure hypercholesterolemia, unspecified 01/20/2021    Elevated LDL cholesterol level    Unspecified fracture of right toe(s), initial encounter for closed fracture 02/19/2021    Toe fracture, right    Unspecified injury of right foot, initial encounter 02/12/2021    Injury of toe on right foot, initial encounter    Unspecified injury of unspecified wrist, hand and finger(s), initial encounter 01/19/2022    Finger injury    Unspecified otitis externa, unspecified ear 07/23/2015    Otitis externa      Social History     Socioeconomic History    Marital status:      Spouse name: brian    Number of children: Not on file    Years of education: Not on file    Highest education level: Not on file   Occupational History    Not on file   Tobacco Use    Smoking status: Never    Smokeless tobacco: Never   Substance and Sexual Activity    Alcohol use: Not Currently    Drug use: Not Currently    Sexual activity: Yes     Partners: Female     Birth control/protection: I.U.D.   Other Topics Concern    Not on file   Social History Narrative    Not on file     Social Determinants of Health     Financial Resource Strain: Not on file   Food Insecurity: Not on file   Transportation Needs: Not on file   Physical Activity: Not on file   Stress: Not on file   Social Connections: Not on file   Intimate Partner  Violence: Not At Risk (12/13/2023)    Humiliation, Afraid, Rape, and Kick questionnaire     Fear of Current or Ex-Partner: No     Emotionally Abused: No     Physically Abused: No     Sexually Abused: No   Housing Stability: Not on file     Current Outpatient Medications   Medication Instructions    albuterol (Ventolin HFA) 90 mcg/actuation inhaler 2 puffs, inhalation, Every 4 hours PRN    amLODIPine (NORVASC) 10 mg, oral, Daily, as directed    aspirin 81 mg, oral, Daily    budesonide (Pulmicort Flexhaler) 180 mcg/actuation inhaler 1 puff, inhalation, 2 times daily RT, Rinse mouth with water after use to reduce aftertaste and incidence of candidiasis. Do not swallow.    cholecalciferol (Vitamin D-3) 5,000 Units tablet 1 tablet, oral, Daily    lisinopriL-hydrochlorothiazide 20-25 mg tablet 1 tablet, oral, Daily    rosuvastatin (CRESTOR) 5 mg, oral, Daily     Allergies   Allergen Reactions    Amoxicillin Hives and Itching    Poison Ivy Unknown             ED Triage Vitals [03/03/24 2225]   Temperature Heart Rate Respirations BP   36.8 °C (98.2 °F) 79 16 (!) 185/101      Pulse Ox Temp Source Heart Rate Source Patient Position   95 % Skin Monitor Sitting      BP Location FiO2 (%)     Left arm --               Labs and Imaging  No orders to display     Labs Reviewed - No data to display        Procedure  Procedures                  Lg Box DO  03/04/24 0114

## 2024-03-04 NOTE — ED PROVIDER NOTES
HPI   Chief Complaint   Patient presents with    Hypertension       HPI    Patient is a 48-year-old male with a history of hypertension that presents to the emergency room for high blood pressure.  Patient states that he was last seen at [facility] yesterday for elevated blood pressure and was given 10 mg of labetalol overnight.  Patient is currently on lisinopril, hydrochlorothiazide, amlodipine that he reports compliance with and took this morning.  Patient states that at home he continued to have elevated blood pressures of 170s/100.  Patient denies any associated symptoms. No pain or discomfort. Patient denies any headache, dizziness, vision changes, chest pain, shortness of breath, lightheadedness, neck pain, back pain, nausea, vomiting, abdominal pain, numbness, tingling, fevers, or chills.  Patient states that he has been more anxious at home as he has been under increased stress due to family health issues and work. No trauma, falls, or injuries. No passing out.                     [place] Coma Scale Score: 15                     Patient History   Past Medical History:   Diagnosis Date    Chronic rhinitis 11/10/2017    Rhinitis    Complete rotator cuff tear or rupture of right shoulder, not specified as traumatic 07/28/2021    Complete tear of right rotator cuff    Contusion of unspecified lesser toe(s) without damage to nail, initial encounter 02/12/2021    Toe contusion    Elevation of levels of liver transaminase levels 11/10/2017    Elevated transaminase level    Encounter for screening for cardiovascular disorders 06/20/2019    Screening for cardiovascular condition    Furuncle, unspecified 01/19/2022    Boil    Hypertension 1/2006    Nondisplaced fracture of first metatarsal bone, right foot, initial encounter for closed fracture 02/22/2021    Closed nondisplaced fracture of first metatarsal bone of right foot, initial encounter    Other abnormality of red blood cells 02/02/2018    Low mean corpuscular  volume (MCV)    Pain in right hip 08/31/2016    Right hip pain    Pain in right shoulder 10/13/2021    Right shoulder pain, unspecified chronicity    Personal history of diseases of the skin and subcutaneous tissue 04/16/2019    History of dermatitis    Personal history of other diseases of male genital organs 07/03/2019    History of epididymitis    Personal history of other diseases of the circulatory system 11/21/2014    History of hypertension    Personal history of other diseases of the circulatory system 07/03/2019    History of varicocele    Personal history of other diseases of the digestive system 03/26/2020    History of irritable bowel syndrome    Personal history of other diseases of the nervous system and sense organs 06/25/2021    History of labyrinthitis    Personal history of other diseases of the respiratory system 12/20/2021    History of sinusitis    Personal history of other mental and behavioral disorders 11/21/2014    History of anxiety disorder    Pleurodynia 02/02/2015    Rib pain on left side    Psoriasis, unspecified 06/20/2019    Psoriasis    Pure hypercholesterolemia, unspecified 01/20/2021    Elevated LDL cholesterol level    Unspecified fracture of right toe(s), initial encounter for closed fracture 02/19/2021    Toe fracture, right    Unspecified injury of right foot, initial encounter 02/12/2021    Injury of toe on right foot, initial encounter    Unspecified injury of unspecified wrist, hand and finger(s), initial encounter 01/19/2022    Finger injury    Unspecified otitis externa, unspecified ear 07/23/2015    Otitis externa     Past Surgical History:   Procedure Laterality Date    HIP SURGERY  09/04/2015    Hip Surgery    KNEE ARTHROSCOPY W/ DEBRIDEMENT  11/21/2014    Arthroscopy Knee Left    OTHER SURGICAL HISTORY  07/26/2019    Tonsillectomy with adenoidectomy    OTHER SURGICAL HISTORY  07/26/2019    [place] tooth extraction    OTHER SURGICAL HISTORY  10/19/2021    Rotator cuff  repair     No family history on file.  Social History     Tobacco Use    Smoking status: Never    Smokeless tobacco: Never   Substance Use Topics    Alcohol use: Not Currently    Drug use: Not Currently       Physical Exam   ED Triage Vitals [03/04/24 1001]   Temperature Heart Rate Respirations BP   36.2 °C (97.2 °F) 84 16 (!) 166/101      Pulse Ox Temp Source Heart Rate Source Patient Position   96 % Temporal -- --      BP Location FiO2 (%)     -- --       Physical Exam  Constitutional:       Appearance: Normal appearance. He is normal weight.   HENT:      Head: Normocephalic and atraumatic.      Right Ear: External ear normal.      Left Ear: External ear normal.      Nose: Nose normal.      Mouth/Throat:      Mouth: Mucous membranes are moist.      Pharynx: Oropharynx is clear.   Eyes:      Conjunctiva/sclera: Conjunctivae normal.      Pupils: Pupils are equal, round, and reactive to light.   Cardiovascular:      Rate and Rhythm: Normal rate and regular rhythm.   Pulmonary:      Effort: Pulmonary effort is normal.      Breath sounds: Normal breath sounds.   Abdominal:      General: Abdomen is flat. Bowel sounds are normal.      Palpations: Abdomen is soft.   Musculoskeletal:         General: Normal range of motion.      Cervical back: Normal range of motion and neck supple.   Skin:     General: Skin is warm and dry.   Neurological:      General: No focal deficit present.      Mental Status: He is alert. Mental status is at baseline.   Psychiatric:         Mood and Affect: Mood normal.         Behavior: Behavior normal.         Thought Content: Thought content normal.         Judgment: Judgment normal.     ED Course & MDM   ED Course as of 03/04/24 1129   Mon Mar 04, 2024   1107 Patient presents today with asymptomatic hypertension.  Patient seen by Jeb yesterday for similar complaints.  He states he has been on the same blood pressure medicine for the past 10 years.  Patient advised to take the log of his blood  pressure daily and bring it to his next primary care physician appointment.  Patient establish an appointment this week.  He additionally expressed anxiety at home secondary to work and family stressors but has lorazepam and BuSpar at home.  Patient provided with a work note for the week in addition to Atarax as needed for anxiety.  Discharged in stable condition. [EG]      ED Course User Index  [EG] Diana Page MD         Diagnoses as of 03/04/24 1129   Asymptomatic hypertension   Anxiety       Medical Decision Making  Patient is a 48-year-old male with a history of hypertension that presents to the emergency room for asymptomatic high blood pressure.  Patient is not complaining of any lightheadedness, dizziness, vision changes, headache, shortness of breath, or chest pain.  His blood pressure here in the emergency room is 166/101. The patient is in no respiratory distress, satting well on room air. Patient is well appearing, neurologically intact.     Differential diagnosis includes but not limited to asymptomatic hypertension, urgency hypertension, anxiety.    Labwork was not felt to be indicated at this time given patient's asymptomatic HTN. He denies any pain anywhere. No shortness of breath, headache, dizziness, or chest pain.   Patient is scheduled to see his provider soon and his primary care outpatient provider was messaged about second episode of being in the emergency room for high blood pressure. His primary care provider responded saying that he will have his team schedule the patient an appointment with him as soon as possible for further management of his outpatient BP medication regiment. Patient is slightly more anxious on exam and was given 10 mg of Atarax.  Patient was given script for 25 mg Atarax as needed to help with his increased stress and anxiety. Patient remained stable, well appearing. He was told to follow-up with their PCP to readjust their hypertensive medications if continues to have  elevated blood pressure.  Patient was currently not symptomatic from the blood pressure therefore less likely necessary to treat the hypertension in the emergency room. The patient felt comfortable being discharged home. The patient was instructed of supportive measures and to follow-up with his primary care physician. Return precautions were provided, for which the patient expressed understanding. The patient was discharged home in stable condition. They should feel free to return to the Emergency Department at any time should their condition worsen or should they have any questions or concerns.      Procedure  Procedures     Linda Perez MD  Resident  03/04/24 3540    I saw and evaluated the patient. I personally obtained the key and critical portions of the history and physical exam or was physically present for key and critical portions performed by the resident/fellow. I reviewed the resident/fellow's documentation and discussed the patient with the resident/fellow. I agree with the resident/fellow's medical decision making as documented in the note.    MD Mily Walls MD  03/05/24 6983

## 2024-03-04 NOTE — DISCHARGE INSTRUCTIONS
Continue to take your blood pressure medication as prescribed.  Follow-up with your primary care physician this week.  Take your anxiety medications as needed.

## 2024-03-10 LAB
ATRIAL RATE: 83 BPM
P AXIS: 52 DEGREES
P OFFSET: 197 MS
P ONSET: 139 MS
PR INTERVAL: 152 MS
Q ONSET: 215 MS
QRS COUNT: 13 BEATS
QRS DURATION: 90 MS
QT INTERVAL: 386 MS
QTC CALCULATION(BAZETT): 453 MS
QTC FREDERICIA: 430 MS
R AXIS: 25 DEGREES
T AXIS: 39 DEGREES
T OFFSET: 408 MS
VENTRICULAR RATE: 83 BPM

## 2024-03-13 ENCOUNTER — HOSPITAL ENCOUNTER (OUTPATIENT)
Dept: RESPIRATORY THERAPY | Facility: HOSPITAL | Age: 49
Discharge: HOME | End: 2024-03-13
Payer: COMMERCIAL

## 2024-03-13 DIAGNOSIS — R06.2 WHEEZING: ICD-10-CM

## 2024-03-13 PROCEDURE — 94729 DIFFUSING CAPACITY: CPT

## 2024-03-13 PROCEDURE — 94726 PLETHYSMOGRAPHY LUNG VOLUMES: CPT | Performed by: PEDIATRICS

## 2024-03-13 PROCEDURE — 94060 EVALUATION OF WHEEZING: CPT | Performed by: PEDIATRICS

## 2024-03-13 PROCEDURE — 94060 EVALUATION OF WHEEZING: CPT

## 2024-03-13 PROCEDURE — 94664 DEMO&/EVAL PT USE INHALER: CPT

## 2024-03-13 PROCEDURE — 94726 PLETHYSMOGRAPHY LUNG VOLUMES: CPT

## 2024-03-13 PROCEDURE — 94729 DIFFUSING CAPACITY: CPT | Performed by: PEDIATRICS

## 2024-03-13 PROCEDURE — 94760 N-INVAS EAR/PLS OXIMETRY 1: CPT

## 2024-03-14 ENCOUNTER — OFFICE VISIT (OUTPATIENT)
Dept: PULMONOLOGY | Facility: CLINIC | Age: 49
End: 2024-03-14
Payer: COMMERCIAL

## 2024-03-14 VITALS
OXYGEN SATURATION: 94 % | DIASTOLIC BLOOD PRESSURE: 93 MMHG | HEART RATE: 69 BPM | SYSTOLIC BLOOD PRESSURE: 149 MMHG | BODY MASS INDEX: 35.86 KG/M2 | WEIGHT: 264.4 LBS

## 2024-03-14 DIAGNOSIS — J18.9 PNEUMONIA DUE TO INFECTIOUS ORGANISM, UNSPECIFIED LATERALITY, UNSPECIFIED PART OF LUNG: Primary | ICD-10-CM

## 2024-03-14 DIAGNOSIS — G47.33 OSA (OBSTRUCTIVE SLEEP APNEA): ICD-10-CM

## 2024-03-14 PROCEDURE — 99214 OFFICE O/P EST MOD 30 MIN: CPT | Performed by: NURSE PRACTITIONER

## 2024-03-14 PROCEDURE — 1036F TOBACCO NON-USER: CPT | Performed by: NURSE PRACTITIONER

## 2024-03-14 PROCEDURE — 3077F SYST BP >= 140 MM HG: CPT | Performed by: NURSE PRACTITIONER

## 2024-03-14 PROCEDURE — 3080F DIAST BP >= 90 MM HG: CPT | Performed by: NURSE PRACTITIONER

## 2024-03-14 NOTE — PATIENT INSTRUCTIONS
Mr. Wen it was a pleasure seeing you in the office today. We discussed the following:    You do have your Pulmicort and albuterol that you can use as needed  Please attend your sleep study    Follow up in 2 months with sleep study

## 2024-03-21 ENCOUNTER — OFFICE VISIT (OUTPATIENT)
Dept: CARDIOLOGY | Facility: HOSPITAL | Age: 49
End: 2024-03-21
Payer: COMMERCIAL

## 2024-03-21 VITALS
OXYGEN SATURATION: 97 % | DIASTOLIC BLOOD PRESSURE: 88 MMHG | SYSTOLIC BLOOD PRESSURE: 147 MMHG | BODY MASS INDEX: 35.91 KG/M2 | HEART RATE: 76 BPM | WEIGHT: 264.77 LBS

## 2024-03-21 DIAGNOSIS — I10 ESSENTIAL HYPERTENSION: ICD-10-CM

## 2024-03-21 DIAGNOSIS — E78.5 HYPERLIPIDEMIA, UNSPECIFIED HYPERLIPIDEMIA TYPE: Primary | ICD-10-CM

## 2024-03-21 PROCEDURE — 99214 OFFICE O/P EST MOD 30 MIN: CPT | Performed by: INTERNAL MEDICINE

## 2024-03-21 PROCEDURE — 3077F SYST BP >= 140 MM HG: CPT | Performed by: INTERNAL MEDICINE

## 2024-03-21 PROCEDURE — 1036F TOBACCO NON-USER: CPT | Performed by: INTERNAL MEDICINE

## 2024-03-21 PROCEDURE — 3079F DIAST BP 80-89 MM HG: CPT | Performed by: INTERNAL MEDICINE

## 2024-03-21 ASSESSMENT — ENCOUNTER SYMPTOMS
CARDIOVASCULAR NEGATIVE: 1
EYES NEGATIVE: 1
PSYCHIATRIC NEGATIVE: 1
RESPIRATORY NEGATIVE: 1
ENDOCRINE NEGATIVE: 1
HEMATOLOGIC/LYMPHATIC NEGATIVE: 1
ALLERGIC/IMMUNOLOGIC NEGATIVE: 1
MUSCULOSKELETAL NEGATIVE: 1
GASTROINTESTINAL NEGATIVE: 1
NEUROLOGICAL NEGATIVE: 1
CONSTITUTIONAL NEGATIVE: 1

## 2024-03-21 NOTE — PROGRESS NOTES
Chief Complaint:   Follow-up (yearly)     History Of Present Illness:    Jeyson Wne is a 49 y.o. male presenting for follow-up.  Has had a lot of stress over the next year--his son was diagnosed with cancer, however went through treatment and is in remission.  Stress levels have been high--blood pressure was elevated and he was started on Coreg--Home blood pressures since then have been 130 systolic.  Denies any chest pain, shortness of breath, dizziness, palpitations.     Last Recorded Vitals:  Vitals:    03/21/24 0952   BP: 147/88   Pulse: 76   SpO2: 97%   Weight: 120 kg (264 lb 12.4 oz)       Past Medical History:  He has a past medical history of Chronic rhinitis (11/10/2017), Complete rotator cuff tear or rupture of right shoulder, not specified as traumatic (07/28/2021), Contusion of unspecified lesser toe(s) without damage to nail, initial encounter (02/12/2021), Elevation of levels of liver transaminase levels (11/10/2017), Encounter for screening for cardiovascular disorders (06/20/2019), Furuncle, unspecified (01/19/2022), Hypertension (1/2006), Nondisplaced fracture of first metatarsal bone, right foot, initial encounter for closed fracture (02/22/2021), Other abnormality of red blood cells (02/02/2018), Pain in right hip (08/31/2016), Pain in right shoulder (10/13/2021), Personal history of diseases of the skin and subcutaneous tissue (04/16/2019), Personal history of other diseases of male genital organs (07/03/2019), Personal history of other diseases of the circulatory system (11/21/2014), Personal history of other diseases of the circulatory system (07/03/2019), Personal history of other diseases of the digestive system (03/26/2020), Personal history of other diseases of the nervous system and sense organs (06/25/2021), Personal history of other diseases of the respiratory system (12/20/2021), Personal history of other mental and behavioral disorders (11/21/2014), Pleurodynia (02/02/2015),  Psoriasis, unspecified (06/20/2019), Pure hypercholesterolemia, unspecified (01/20/2021), Unspecified fracture of right toe(s), initial encounter for closed fracture (02/19/2021), Unspecified injury of right foot, initial encounter (02/12/2021), Unspecified injury of unspecified wrist, hand and finger(s), initial encounter (01/19/2022), and Unspecified otitis externa, unspecified ear (07/23/2015).    Past Surgical History:  He has a past surgical history that includes Knee arthroscopy w/ debridement (11/21/2014); Other surgical history (07/26/2019); Other surgical history (07/26/2019); Other surgical history (10/19/2021); and Hip surgery (09/04/2015).      Social History:  He reports that he has never smoked. He has never used smokeless tobacco. He reports that he does not currently use alcohol. He reports that he does not currently use drugs.    Family History:  No family history on file.     Allergies:  Amoxicillin and Poison ivy    Outpatient Medications:  Current Outpatient Medications   Medication Instructions    albuterol (Ventolin HFA) 90 mcg/actuation inhaler 2 puffs, inhalation, Every 4 hours PRN    amLODIPine (NORVASC) 10 mg, oral, Daily, as directed    aspirin 81 mg, oral, Daily    budesonide (Pulmicort Flexhaler) 180 mcg/actuation inhaler 1 puff, inhalation, 2 times daily RT, Rinse mouth with water after use to reduce aftertaste and incidence of candidiasis. Do not swallow.    carvedilol (COREG) 6.25 mg, oral, 2 times daily with meals    cholecalciferol (Vitamin D-3) 5,000 Units tablet 1 tablet, oral, Daily    hydrOXYzine HCL (ATARAX) 25 mg, oral, Every 6 hours PRN    lisinopriL-hydrochlorothiazide 20-25 mg tablet 1 tablet, oral, Daily    LORazepam (ATIVAN) 0.5 mg, oral, 3 times daily PRN    rosuvastatin (CRESTOR) 5 mg, oral, Daily     Review of Systems   Constitutional: Negative.    HENT: Negative.     Eyes: Negative.    Respiratory: Negative.     Cardiovascular: Negative.    Gastrointestinal: Negative.     Endocrine: Negative.    Genitourinary: Negative.    Musculoskeletal: Negative.    Skin: Negative.    Allergic/Immunologic: Negative.    Neurological: Negative.    Hematological: Negative.    Psychiatric/Behavioral: Negative.          Physical Exam:  Constitutional:       Appearance: Healthy appearance. Not in distress.   Neck:      Vascular: No JVR. JVD normal.   Pulmonary:      Effort: Pulmonary effort is normal.      Breath sounds: Normal breath sounds. No wheezing. No rhonchi. No rales.   Chest:      Chest wall: Not tender to palpatation.   Cardiovascular:      Normal rate. Regular rhythm.      Murmurs: There is no murmur.   Edema:     Peripheral edema absent.   Abdominal:      General: Bowel sounds are normal.      Palpations: Abdomen is soft.      Tenderness: There is no abdominal tenderness.   Musculoskeletal: Normal range of motion. Skin:     General: Skin is warm and dry.   Neurological:      General: No focal deficit present.      Mental Status: Alert and oriented to person, place and time.           Last Labs:  CBC -  Lab Results   Component Value Date    WBC 6.9 06/24/2021    HGB 16.6 06/24/2021    HCT 49.0 06/24/2021    MCV 85 06/24/2021     06/24/2021       CMP -  Lab Results   Component Value Date    CALCIUM 9.8 06/24/2021    PROT 7.7 06/24/2021    ALBUMIN 5.1 (H) 06/24/2021    AST 53 (H) 06/24/2021    ALT 82 (H) 06/24/2021    ALKPHOS 76 06/24/2021    BILITOT 0.8 06/24/2021       LIPID PANEL -   Lab Results   Component Value Date    CHOL 238 (H) 06/24/2021    TRIG 188 (H) 06/24/2021    HDL 48.1 06/24/2021    CHHDL 4.9 06/24/2021    LDLF 152 (H) 06/24/2021    VLDL 38 06/24/2021       RENAL FUNCTION PANEL -   Lab Results   Component Value Date    GLUCOSE 87 06/24/2021     06/24/2021    K 4.2 06/24/2021     06/24/2021    CO2 30 06/24/2021    ANIONGAP 14 06/24/2021    BUN 16 06/24/2021    CREATININE 0.73 06/24/2021    CALCIUM 9.8 06/24/2021    ALBUMIN 5.1 (H) 06/24/2021        Lab  Results   Component Value Date    HGBA1C 5.3 06/24/2021       Last Cardiology Tests:  ECG independently reviewed from 3/3/24: sinus rhythm, rate 83     CAC score 7/2019:  LM 4.18, 2  LAD 68.22, 2  LCx 1.1, 1  RCA 22.45, 6     Total 95.95    Assessment/Plan   Mr. Wen is a very pleasant 49 year old male with a history of hypertension, dyslipidemia, strong FMHx of CAD presenting for CAD risk assessment. 10 year CAD risk with CAC Score included is 14%.      Plan:  -tolerating rosuvastatin 5mg daily currently -- history of pravastatin and rosuvastatin intolerance--check lipids/vit D and titrate as applicable   -continue ASA 81mg daily, amlodipine, lisinopril-hydrochlorothiazide, and coreg  -check renal artery ultrasound for ALVIN  -AAA screening with dad having AAA in his 40s      Rafael Evans MD

## 2024-03-26 ENCOUNTER — APPOINTMENT (OUTPATIENT)
Dept: VASCULAR MEDICINE | Facility: HOSPITAL | Age: 49
End: 2024-03-26
Payer: COMMERCIAL

## 2024-03-31 DIAGNOSIS — F41.9 ANXIETY: ICD-10-CM

## 2024-04-01 ENCOUNTER — HOSPITAL ENCOUNTER (OUTPATIENT)
Dept: VASCULAR MEDICINE | Facility: HOSPITAL | Age: 49
Discharge: HOME | End: 2024-04-01
Payer: COMMERCIAL

## 2024-04-01 DIAGNOSIS — I10 ESSENTIAL HYPERTENSION: ICD-10-CM

## 2024-04-01 DIAGNOSIS — Z13.6 ENCOUNTER FOR SCREENING FOR CARDIOVASCULAR DISORDERS: ICD-10-CM

## 2024-04-01 PROCEDURE — 93975 VASCULAR STUDY: CPT | Performed by: INTERNAL MEDICINE

## 2024-04-01 PROCEDURE — 76706 US ABDL AORTA SCREEN AAA: CPT | Performed by: INTERNAL MEDICINE

## 2024-04-01 PROCEDURE — 76706 US ABDL AORTA SCREEN AAA: CPT | Mod: 59

## 2024-04-01 PROCEDURE — 93975 VASCULAR STUDY: CPT

## 2024-04-01 RX ORDER — HYDROXYZINE HYDROCHLORIDE 25 MG/1
25 TABLET, FILM COATED ORAL EVERY 6 HOURS PRN
Qty: 90 TABLET | Refills: 0 | Status: SHIPPED | OUTPATIENT
Start: 2024-04-01 | End: 2024-04-25

## 2024-04-02 LAB
MGC ASCENT PFT - FEV1 - POST: 4
MGC ASCENT PFT - FEV1 - PRE: 3.92
MGC ASCENT PFT - FEV1 - PREDICTED: 4.02
MGC ASCENT PFT - FVC - POST: 5.29
MGC ASCENT PFT - FVC - PRE: 5.25
MGC ASCENT PFT - FVC - PREDICTED: 5.06

## 2024-04-03 ENCOUNTER — APPOINTMENT (OUTPATIENT)
Dept: CARDIOLOGY | Facility: HOSPITAL | Age: 49
End: 2024-04-03
Payer: COMMERCIAL

## 2024-04-03 DIAGNOSIS — I10 BENIGN ESSENTIAL HYPERTENSION: ICD-10-CM

## 2024-04-03 RX ORDER — CARVEDILOL 6.25 MG/1
6.25 TABLET ORAL
Qty: 60 TABLET | Refills: 2 | Status: SHIPPED | OUTPATIENT
Start: 2024-04-03 | End: 2024-05-28

## 2024-04-18 ENCOUNTER — CLINICAL SUPPORT (OUTPATIENT)
Dept: SLEEP MEDICINE | Facility: CLINIC | Age: 49
End: 2024-04-18
Payer: COMMERCIAL

## 2024-04-18 DIAGNOSIS — G47.33 OBSTRUCTIVE SLEEP APNEA (ADULT) (PEDIATRIC): ICD-10-CM

## 2024-04-18 DIAGNOSIS — R29.818 SUSPECTED SLEEP APNEA: ICD-10-CM

## 2024-04-18 PROCEDURE — 95810 POLYSOM 6/> YRS 4/> PARAM: CPT | Performed by: PSYCHIATRY & NEUROLOGY

## 2024-04-18 ASSESSMENT — SLEEP AND FATIGUE QUESTIONNAIRES: ESS TOTAL SCORE: 5

## 2024-04-18 ASSESSMENT — PAIN SCALES - GENERAL: PAINLEVEL: 0-NO PAIN

## 2024-04-19 VITALS
BODY MASS INDEX: 35.76 KG/M2 | RESPIRATION RATE: 12 BRPM | HEIGHT: 72 IN | WEIGHT: 264 LBS | SYSTOLIC BLOOD PRESSURE: 136 MMHG | DIASTOLIC BLOOD PRESSURE: 84 MMHG | OXYGEN SATURATION: 94 % | HEART RATE: 75 BPM

## 2024-04-19 ASSESSMENT — SLEEP AND FATIGUE QUESTIONNAIRES
LYING DOWN TO REST OR NAP IN THE AFTERNOON: 2
SITTING IN A CLASSROOM AT SCHOOL DURING THE MORNING: 0
SITTING AND RIDING IN A CAR OR BUS FOR ABOUT HALF AN HOUR: 0
SITTING AND READING: 1
ESS-CHAD TOTAL SCORE: 5
SITTING QUITELY BY YOURSELF AFTER LUNCH: 0
SITTING AND TALKING TO SOMEONE: 0
SITTING AND EATING A MEAL: 0
SITTING AND WATCHING TV OR A VIDEO: 2

## 2024-04-19 NOTE — PROGRESS NOTES
[facility] TECH NOTE:     Patient: Jeyson Wen   MRN//AGE: [id number]  1975  49 y.o.   Technologist: Kathi Howard   Room: 107   Service Date: 2024        Sleep Testing Location: [facility]:     TECHNOLOGIST SLEEP STUDY PROCEDURE NOTE:   This sleep study is being conducted according to the policies and procedures outlined by the AAS accreditation standards.  The sleep study procedure and processes involved during this appointment was explained to the patient/patient’s family, questions were answered. The patient/family verbalized understanding.      The patient is a 49 y.o. year old male scheduled for a Diagnostic PSG. He arrived for his appointment.      The study that was ultimately completed was a Diagnostic PSG.     The full study Was completed.  Patient questionnaires completed?: yes     Consents signed? yes    Initial Fall Risk Screening:     Jeyson has not fallen in the last 6 months. Jeyson does not have a fear of falling. He does not need assistance with sitting, standing, or walking.  He does not need assistance walking in his home. He does not need assistance in an unfamiliar setting. The patient is not using an assistive device.     Brief Study observations: 49 year old male presents for a Diagnostic PSG. Patient did not wake to use the restroom during the night. Moderate/intermittent/positional snoring observed. NSR observed. All sleep stages observed. REM observed.      Deviation to order/protocol and reason: none      If PAP, which was preferred mask/pressure/mode: NA    Other:None    After the procedure, the patient/family was informed to ensure followup with ordering clinician for testing results.      Technologist: Kathi Howard

## 2024-04-25 DIAGNOSIS — F41.9 ANXIETY: ICD-10-CM

## 2024-04-25 RX ORDER — HYDROXYZINE HYDROCHLORIDE 25 MG/1
25 TABLET, FILM COATED ORAL EVERY 6 HOURS PRN
Qty: 90 TABLET | Refills: 0 | Status: SHIPPED | OUTPATIENT
Start: 2024-04-25 | End: 2024-05-21

## 2024-05-03 ENCOUNTER — OFFICE VISIT (OUTPATIENT)
Dept: PRIMARY CARE | Facility: CLINIC | Age: 49
End: 2024-05-03
Payer: COMMERCIAL

## 2024-05-03 VITALS
HEART RATE: 89 BPM | SYSTOLIC BLOOD PRESSURE: 140 MMHG | BODY MASS INDEX: 34.58 KG/M2 | WEIGHT: 255 LBS | OXYGEN SATURATION: 94 % | DIASTOLIC BLOOD PRESSURE: 90 MMHG | TEMPERATURE: 98.1 F

## 2024-05-03 DIAGNOSIS — H66.002 NON-RECURRENT ACUTE SUPPURATIVE OTITIS MEDIA OF LEFT EAR WITHOUT SPONTANEOUS RUPTURE OF TYMPANIC MEMBRANE: Primary | ICD-10-CM

## 2024-05-03 PROCEDURE — 3077F SYST BP >= 140 MM HG: CPT

## 2024-05-03 PROCEDURE — 99214 OFFICE O/P EST MOD 30 MIN: CPT

## 2024-05-03 PROCEDURE — 3080F DIAST BP >= 90 MM HG: CPT

## 2024-05-03 RX ORDER — CIPROFLOXACIN AND DEXAMETHASONE 3; 1 MG/ML; MG/ML
4 SUSPENSION/ DROPS AURICULAR (OTIC) 2 TIMES DAILY
Qty: 7.5 ML | Refills: 0 | Status: SHIPPED | OUTPATIENT
Start: 2024-05-03 | End: 2024-05-10

## 2024-05-03 NOTE — PROGRESS NOTES
Subjective   Patient ID: Jeyson Wen is a 49 y.o. male who presents for Ear Drainage (Jeyson is here for left ear drainage. Started with allergies then his ear started to drain on Wednesday. Was yellow. ).  Subjective  Jeyson Wen is a 49 y.o. male who presents with ear pain and possible ear infection. Symptoms include: L ear drainage . Onset of symptoms was 3 days ago, and have been gradually worsening since that time. Associated symptoms include: none.  Patient denies: chills, coryza, headache, and post nasal drip. He is drinking plenty of fluids.    Objective  /90   Pulse 89   Temp 36.7 °C (98.1 °F)   Wt 116 kg (255 lb)   SpO2 94%   BMI 34.58 kg/m²   General:  alert and oriented, in no acute distress  Right Ear: red  Left Ear: red, inflamed, and with sero-sanguinous discharge  Mouth:  lips, mucosa, and tongue normal; teeth and gums normal  Neck: no adenopathy, no carotid bruit, no JVD, supple, symmetrical, trachea midline, and thyroid not enlarged, symmetric, no tenderness/mass/nodules    Assessment/Plan  There are no diagnoses linked to this encounter.      Ear Drainage         Vitals:    05/03/24 0859   BP: 140/90   Pulse: 89   Temp: 36.7 °C (98.1 °F)   SpO2: 94%       Review of Systems    Objective   Physical Exam    Assessment/Plan   Problem List Items Addressed This Visit    None  Visit Diagnoses       Non-recurrent acute suppurative otitis media of left ear without spontaneous rupture of tympanic membrane    -  Primary    Relevant Medications    ciprofloxacin-dexamethasone (CiproDEX) otic suspension                 Thank you for coming in today, please call my office if you have any concerns or questions.     Dick LOPEZ, CNP

## 2024-05-03 NOTE — PATIENT INSTRUCTIONS
Call Monday if not better  Ciprodex drops 4 drops 2x daily    Thank you for coming in today, if any questions or concerns arise, please call my office.   Dick Ayala, JESSICA-CNP

## 2024-05-06 ENCOUNTER — OFFICE VISIT (OUTPATIENT)
Dept: PULMONOLOGY | Facility: CLINIC | Age: 49
End: 2024-05-06
Payer: COMMERCIAL

## 2024-05-06 VITALS
HEART RATE: 72 BPM | WEIGHT: 257.6 LBS | BODY MASS INDEX: 34.94 KG/M2 | SYSTOLIC BLOOD PRESSURE: 154 MMHG | OXYGEN SATURATION: 94 % | DIASTOLIC BLOOD PRESSURE: 97 MMHG

## 2024-05-06 DIAGNOSIS — G47.33 OSA (OBSTRUCTIVE SLEEP APNEA): Primary | ICD-10-CM

## 2024-05-06 DIAGNOSIS — R06.00 DYSPNEA, UNSPECIFIED TYPE: ICD-10-CM

## 2024-05-06 PROCEDURE — 99214 OFFICE O/P EST MOD 30 MIN: CPT | Performed by: NURSE PRACTITIONER

## 2024-05-06 PROCEDURE — 3080F DIAST BP >= 90 MM HG: CPT | Performed by: NURSE PRACTITIONER

## 2024-05-06 PROCEDURE — 3077F SYST BP >= 140 MM HG: CPT | Performed by: NURSE PRACTITIONER

## 2024-05-06 PROCEDURE — 1036F TOBACCO NON-USER: CPT | Performed by: NURSE PRACTITIONER

## 2024-05-06 NOTE — PROGRESS NOTES
Subjective   Patient ID: Jeyson Wen is a 49 y.o. male who presents for No chief complaint on file..  HPI  HPI:  New patient here today to establish care.  Patient was sent from his PCP Avery Ayala for wheezing and possible sleep apnea.    05/06/24 he is here today for follow-up.  Patient's been doing well.  He has since Pulmicort and albuterol.  He has noticed that he is not wheezing.  I will send him to an allergist I think part of this is allergy related and he has a history of allergies and chronic sinusitis.  His sleep study does show that he has MARYJANE we discussed ordering an auto CPAP versus sending him to the lab he would like to go back to the laboratory.       Inhalers/nebulized medications: Pulmicort and albuterol     Hospitalization History:  he has not been hospitalized over the last year for breathing related problem.     Sleep history:  Admits to feeling tired during the day.  Complains of snoring, ? Apneas. Feels tired during the day. Does not take frequent naps. 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      Objective   Physical Exam  Vitals and nursing note reviewed.   Constitutional:       Appearance: Normal appearance. He is obese.   HENT:      Head: Normocephalic.      Nose: Nose normal.   Eyes:      Pupils: Pupils are equal, round, and reactive to light.   Cardiovascular:      Rate and Rhythm: Normal rate.  "  Pulmonary:      Effort: Pulmonary effort is normal.      Breath sounds: Normal breath sounds.   Neurological:      General: No focal deficit present.      Mental Status: He is alert and oriented to person, place, and time. Mental status is at baseline.   Psychiatric:         Mood and Affect: Mood normal.         Behavior: Behavior normal.         Thought Content: Thought content normal.         Judgment: Judgment normal.         Assessment/Plan      # COOPER   Had PNA around thanksgiving    was at [employer] where he most likely contracted the PNA  Had 2 rounds of antibiotics   Has Pulmicort and albuterol  Wheezing has subsided  PFT FEV1 99%, slight restriction   Never smoker   Feeling at baseline now  05/06/24  - PFT was normal  FEV1 99%.. he has allergies, he is taking Claritin. I will refer him to an allergist     # Possible sleep apnea  Has HTN  Dad had MARYJANE  Sleep study ordered by Avery Ayala    # MARYJANE:   -He had a sleep study on 4/18/24   Patient had a RDI 3% of 34.4 and at 4% was 25.0.  His oxygen saturation was below 88% for 99.8 minutes.  SpO2 farhan was 82%.  He needs a titration study at the [facility] lab   -counseled to avoid supine sleeping. Can buy \"Winkcam\" online to help.  -discussed avoiding compliance measures, avoiding sedatives and alcohol and caution driving and operating machinery  -Obesity: discussed the importance of weight loss         Mr. Wen it was a pleasure seeing you in the office today. We discussed the following:    You do have your Pulmicort and albuterol that you can use as needed  You have sleep apnea I am sending you back to the lab for a titration study  Please attend your sleep study    Follow up in 2 months with titration study           ROSSANA White 05/06/24 8:11 AM   "

## 2024-05-06 NOTE — PATIENT INSTRUCTIONS
Mr. Wen it was a pleasure seeing you in the office today. We discussed the following:    You do have your Pulmicort and albuterol that you can use as needed  You have sleep apnea I am sending you back to the lab for a titration study  Please attend your sleep study    Follow up in 2 months with titration study

## 2024-05-21 DIAGNOSIS — F41.9 ANXIETY: ICD-10-CM

## 2024-05-21 DIAGNOSIS — E78.5 HYPERLIPIDEMIA, UNSPECIFIED HYPERLIPIDEMIA TYPE: ICD-10-CM

## 2024-05-21 RX ORDER — HYDROXYZINE HYDROCHLORIDE 25 MG/1
25 TABLET, FILM COATED ORAL EVERY 6 HOURS PRN
Qty: 90 TABLET | Refills: 0 | Status: SHIPPED | OUTPATIENT
Start: 2024-05-21 | End: 2024-06-20

## 2024-05-25 DIAGNOSIS — I10 BENIGN ESSENTIAL HYPERTENSION: ICD-10-CM

## 2024-05-28 RX ORDER — CARVEDILOL 6.25 MG/1
6.25 TABLET ORAL
Qty: 180 TABLET | Refills: 1 | Status: SHIPPED | OUTPATIENT
Start: 2024-05-28

## 2024-06-21 DIAGNOSIS — F41.9 ANXIETY: ICD-10-CM

## 2024-06-24 RX ORDER — HYDROXYZINE HYDROCHLORIDE 25 MG/1
25 TABLET, FILM COATED ORAL EVERY 6 HOURS PRN
Qty: 90 TABLET | Refills: 0 | Status: SHIPPED | OUTPATIENT
Start: 2024-06-24 | End: 2024-07-24

## 2024-07-10 ENCOUNTER — APPOINTMENT (OUTPATIENT)
Dept: PULMONOLOGY | Facility: CLINIC | Age: 49
End: 2024-07-10
Payer: COMMERCIAL

## 2024-07-16 DIAGNOSIS — I15.9 SECONDARY HYPERTENSION: ICD-10-CM

## 2024-07-16 DIAGNOSIS — E78.5 HYPERLIPIDEMIA, UNSPECIFIED HYPERLIPIDEMIA TYPE: ICD-10-CM

## 2024-07-16 RX ORDER — LISINOPRIL AND HYDROCHLOROTHIAZIDE 20; 25 MG/1; MG/1
1 TABLET ORAL DAILY
Qty: 90 TABLET | Refills: 0 | Status: SHIPPED | OUTPATIENT
Start: 2024-07-16 | End: 2025-01-12

## 2024-07-16 RX ORDER — ROSUVASTATIN CALCIUM 5 MG/1
5 TABLET, COATED ORAL DAILY
Qty: 90 TABLET | Refills: 0 | Status: SHIPPED | OUTPATIENT
Start: 2024-07-16 | End: 2025-07-16

## 2024-07-21 ENCOUNTER — CLINICAL SUPPORT (OUTPATIENT)
Dept: SLEEP MEDICINE | Facility: CLINIC | Age: 49
End: 2024-07-21
Payer: COMMERCIAL

## 2024-07-21 DIAGNOSIS — G47.33 OSA (OBSTRUCTIVE SLEEP APNEA): ICD-10-CM

## 2024-07-21 ASSESSMENT — SLEEP AND FATIGUE QUESTIONNAIRES: ESS TOTAL SCORE: 5

## 2024-07-21 ASSESSMENT — PAIN SCALES - GENERAL: PAINLEVEL: 0-NO PAIN

## 2024-07-22 VITALS
SYSTOLIC BLOOD PRESSURE: 149 MMHG | TEMPERATURE: 97.9 F | DIASTOLIC BLOOD PRESSURE: 81 MMHG | HEIGHT: 72 IN | RESPIRATION RATE: 17 BRPM | WEIGHT: 257 LBS | OXYGEN SATURATION: 97 % | HEART RATE: 82 BPM | BODY MASS INDEX: 34.81 KG/M2

## 2024-07-22 ASSESSMENT — SLEEP AND FATIGUE QUESTIONNAIRES
SITTING QUITELY BY YOURSELF AFTER LUNCH: 0
SITTING AND WATCHING TV OR A VIDEO: 2
SITTING AND EATING A MEAL: 0
SITTING AND TALKING TO SOMEONE: 0
SITTING AND RIDING IN A CAR OR BUS FOR ABOUT HALF AN HOUR: 0
ESS-CHAD TOTAL SCORE: 5
SITTING AND READING: 1
SITTING IN A CLASSROOM AT SCHOOL DURING THE MORNING: 0
LYING DOWN TO REST OR NAP IN THE AFTERNOON: 2

## 2024-07-22 NOTE — PROGRESS NOTES
[facility] TECH NOTE:     Patient: Jeyson Wen   MRN//AGE: [id number]  1975  49 y.o.   Technologist: Kathi Howard   Room: 105   Service Date: 2024        Sleep Testing Location: [facility]:     TECHNOLOGIST SLEEP STUDY PROCEDURE NOTE:   This sleep study is being conducted according to the policies and procedures outlined by the AAS accreditation standards.  The sleep study procedure and processes involved during this appointment was explained to the patient/patient’s family, questions were answered. The patient/family verbalized understanding.      The patient is a 49 y.o. year old male scheduled for a CPAP titration. He arrived for his appointment.      The study that was ultimately completed was a CPAP titration    The full study Was completed.  Patient questionnaires completed?: yes     Consents signed? yes    Initial Fall Risk Screening:     Jeyson has fallen in the last 6 months. Jeyson does not have a fear of falling. He does not need assistance with sitting, standing, or walking. He does not need assistance walking in his home. He does not need assistance in an unfamiliar setting. The patient is notusing an assistive device.     Brief Study observations: 49 year old male presents for a CPAP titration study. CPAP titration was started out at a pressure of 6 cmH2O using an F&P Vitera full face/medium mask with an ending pressure of 11 cmH2O. Patient seemed to have tolerated the pressure and mask fit well. Patient did not wake to use the restroom throughout the night. Mild/intermittent/positional snoring observed. NSR observed. Many PVCs were observed. REM was observed.      Deviation to order/protocol and reason: none      If PAP, which was preferred mask/pressure/mode: F&P Vitera full face/medium mask. Starting pressure 6 cmH2O with an ending pressure of 11 cmH2O. Patient seemed to have tolerated the pressure and mask fit well.     Other:None    After the procedure, the patient/family was  informed to ensure followup with ordering clinician for testing results.      Technologist: Kathi Howard

## 2024-08-23 DIAGNOSIS — I15.9 SECONDARY HYPERTENSION: ICD-10-CM

## 2024-08-23 RX ORDER — AMLODIPINE BESYLATE 10 MG/1
10 TABLET ORAL DAILY
Qty: 90 TABLET | Refills: 1 | Status: SHIPPED | OUTPATIENT
Start: 2024-08-23 | End: 2025-02-19

## 2024-10-11 DIAGNOSIS — I15.9 SECONDARY HYPERTENSION: ICD-10-CM

## 2024-10-11 RX ORDER — LISINOPRIL AND HYDROCHLOROTHIAZIDE 20; 25 MG/1; MG/1
1 TABLET ORAL DAILY
Qty: 90 TABLET | Refills: 0 | Status: SHIPPED | OUTPATIENT
Start: 2024-10-11

## 2024-10-12 DIAGNOSIS — E78.5 HYPERLIPIDEMIA, UNSPECIFIED HYPERLIPIDEMIA TYPE: ICD-10-CM

## 2024-10-18 RX ORDER — ROSUVASTATIN CALCIUM 5 MG/1
5 TABLET, COATED ORAL DAILY
Qty: 90 TABLET | Refills: 0 | OUTPATIENT
Start: 2024-10-18

## 2024-10-21 DIAGNOSIS — E78.5 HYPERLIPIDEMIA, UNSPECIFIED HYPERLIPIDEMIA TYPE: ICD-10-CM

## 2024-10-21 RX ORDER — ROSUVASTATIN CALCIUM 5 MG/1
5 TABLET, COATED ORAL DAILY
Qty: 90 TABLET | Refills: 0 | OUTPATIENT
Start: 2024-10-21

## 2024-11-23 DIAGNOSIS — I10 BENIGN ESSENTIAL HYPERTENSION: ICD-10-CM

## 2024-11-26 RX ORDER — CARVEDILOL 6.25 MG/1
6.25 TABLET ORAL
Qty: 180 TABLET | Refills: 1 | Status: SHIPPED | OUTPATIENT
Start: 2024-11-26

## 2025-01-10 DIAGNOSIS — I15.9 SECONDARY HYPERTENSION: ICD-10-CM

## 2025-01-10 RX ORDER — LISINOPRIL AND HYDROCHLOROTHIAZIDE 20; 25 MG/1; MG/1
1 TABLET ORAL DAILY
Qty: 90 TABLET | Refills: 0 | OUTPATIENT
Start: 2025-01-10

## 2025-01-16 ENCOUNTER — APPOINTMENT (OUTPATIENT)
Dept: PRIMARY CARE | Facility: CLINIC | Age: 50
End: 2025-01-16
Payer: COMMERCIAL

## 2025-01-16 VITALS
DIASTOLIC BLOOD PRESSURE: 86 MMHG | HEART RATE: 76 BPM | WEIGHT: 264.8 LBS | OXYGEN SATURATION: 97 % | TEMPERATURE: 97.4 F | BODY MASS INDEX: 35.87 KG/M2 | SYSTOLIC BLOOD PRESSURE: 146 MMHG | HEIGHT: 72 IN

## 2025-01-16 DIAGNOSIS — I15.9 SECONDARY HYPERTENSION: ICD-10-CM

## 2025-01-16 DIAGNOSIS — I10 BENIGN ESSENTIAL HYPERTENSION: ICD-10-CM

## 2025-01-16 DIAGNOSIS — E78.5 HYPERLIPIDEMIA, UNSPECIFIED HYPERLIPIDEMIA TYPE: ICD-10-CM

## 2025-01-16 DIAGNOSIS — L40.9 PSORIASIS: Primary | ICD-10-CM

## 2025-01-16 PROCEDURE — 3077F SYST BP >= 140 MM HG: CPT

## 2025-01-16 PROCEDURE — 99213 OFFICE O/P EST LOW 20 MIN: CPT

## 2025-01-16 PROCEDURE — 3008F BODY MASS INDEX DOCD: CPT

## 2025-01-16 PROCEDURE — 1036F TOBACCO NON-USER: CPT

## 2025-01-16 PROCEDURE — 3079F DIAST BP 80-89 MM HG: CPT

## 2025-01-16 RX ORDER — ROSUVASTATIN CALCIUM 5 MG/1
5 TABLET, COATED ORAL DAILY
Qty: 90 TABLET | Refills: 1 | Status: SHIPPED | OUTPATIENT
Start: 2025-01-16 | End: 2026-01-16

## 2025-01-16 RX ORDER — LISINOPRIL AND HYDROCHLOROTHIAZIDE 20; 25 MG/1; MG/1
1 TABLET ORAL DAILY
Qty: 90 TABLET | Refills: 1 | Status: SHIPPED | OUTPATIENT
Start: 2025-01-16

## 2025-01-16 RX ORDER — CARVEDILOL 6.25 MG/1
6.25 TABLET ORAL
Qty: 180 TABLET | Refills: 1 | Status: SHIPPED | OUTPATIENT
Start: 2025-01-16

## 2025-01-16 RX ORDER — TRIAMCINOLONE ACETONIDE 1 MG/G
CREAM TOPICAL 2 TIMES DAILY
Qty: 28.4 G | Refills: 0 | Status: SHIPPED | OUTPATIENT
Start: 2025-01-16

## 2025-01-16 ASSESSMENT — ENCOUNTER SYMPTOMS
GASTROINTESTINAL NEGATIVE: 1
ENDOCRINE NEGATIVE: 1
WEAKNESS: 0
MUSCULOSKELETAL NEGATIVE: 1
ACTIVITY CHANGE: 0
ALLERGIC/IMMUNOLOGIC NEGATIVE: 1
FATIGUE: 0
DIZZINESS: 0
ABDOMINAL PAIN: 0
HEADACHES: 0
UNEXPECTED WEIGHT CHANGE: 0
FEVER: 0
SHORTNESS OF BREATH: 0
CARDIOVASCULAR NEGATIVE: 1
PSYCHIATRIC NEGATIVE: 1
RESPIRATORY NEGATIVE: 1

## 2025-01-16 NOTE — PATIENT INSTRUCTIONS
You are doing well  See in 6 months yearly wellness and blood work    Thank you for coming in today, if any questions or concerns arise, please call my office.   Dick Ayala, JESSICA-CNP

## 2025-01-16 NOTE — PROGRESS NOTES
Subjective   Patient ID: Jeyson Wen is a 49 y.o. male who presents for Follow-up (Here for renewal for bp medication ) and Psoriasis (Pt thinks he's having a flare up on his hands because he has some dry flaky skin on his fingers ).    Hypertension  Patient is here for follow-up of elevated blood pressure. He is exercising and is adherent to a low-salt diet. Blood pressure is well controlled at home. Cardiac symptoms: none. Patient denies chest pressure/discomfort, claudication, dyspnea, exertional chest pressure/discomfort, fatigue, irregular heart beat, lower extremity edema, near-syncope, orthopnea, palpitations, paroxysmal nocturnal dyspnea, syncope, and tachypnea. Cardiovascular risk factors: family history of premature cardiovascular disease, obesity (BMI >= 30 kg/m2), and sedentary lifestyle. Use of agents associated with hypertension: none. History of target organ damage: none.    Doing well overall, no recent illnesses        Vitals:    01/16/25 0856   BP: 146/86   Pulse: 76   Temp: 36.3 °C (97.4 °F)   SpO2: 97%       Review of Systems   Constitutional:  Negative for activity change, fatigue, fever and unexpected weight change.   HENT: Negative.     Respiratory: Negative.  Negative for shortness of breath.    Cardiovascular: Negative.  Negative for chest pain.   Gastrointestinal: Negative.  Negative for abdominal pain.   Endocrine: Negative.    Musculoskeletal: Negative.    Skin: Negative.    Allergic/Immunologic: Negative.    Neurological:  Negative for dizziness, weakness and headaches.   Psychiatric/Behavioral: Negative.         Objective   Physical Exam  Vitals and nursing note reviewed.   Constitutional:       Appearance: Normal appearance.   HENT:      Head: Normocephalic.      Mouth/Throat:      Mouth: Mucous membranes are moist.   Cardiovascular:      Rate and Rhythm: Normal rate and regular rhythm.      Pulses: Normal pulses.      Heart sounds: Normal heart sounds. No murmur heard.     No  friction rub. No gallop.   Pulmonary:      Effort: Pulmonary effort is normal. No respiratory distress.      Breath sounds: Normal breath sounds. No wheezing.   Abdominal:      General: Bowel sounds are normal. There is no distension.      Palpations: Abdomen is soft.      Tenderness: There is no abdominal tenderness.   Musculoskeletal:         General: No deformity. Normal range of motion.   Skin:     General: Skin is warm and dry.      Capillary Refill: Capillary refill takes less than 2 seconds.   Neurological:      General: No focal deficit present.      Mental Status: He is alert and oriented to person, place, and time.   Psychiatric:         Mood and Affect: Mood normal.         Assessment/Plan   Problem List Items Addressed This Visit       Psoriasis - Primary    Relevant Medications    triamcinolone (Kenalog) 0.1 % cream    Hyperlipidemia    Relevant Medications    rosuvastatin (Crestor) 5 mg tablet    Benign essential hypertension    Relevant Medications    carvedilol (Coreg) 6.25 mg tablet     Other Visit Diagnoses       Secondary hypertension        Relevant Medications    lisinopriL-hydrochlorothiazide 20-25 mg tablet                 Thank you for coming in today, please call my office if you have any concerns or questions.     Dick LOPEZ, CNP

## 2025-02-23 DIAGNOSIS — I15.9 SECONDARY HYPERTENSION: ICD-10-CM

## 2025-02-24 RX ORDER — AMLODIPINE BESYLATE 10 MG/1
10 TABLET ORAL DAILY
Qty: 90 TABLET | Refills: 1 | Status: SHIPPED | OUTPATIENT
Start: 2025-02-24 | End: 2025-08-23

## 2025-03-20 ENCOUNTER — OFFICE VISIT (OUTPATIENT)
Dept: CARDIOLOGY | Facility: HOSPITAL | Age: 50
End: 2025-03-20
Payer: COMMERCIAL

## 2025-03-20 ENCOUNTER — TELEPHONE (OUTPATIENT)
Dept: CARDIOLOGY | Facility: HOSPITAL | Age: 50
End: 2025-03-20

## 2025-03-20 VITALS
DIASTOLIC BLOOD PRESSURE: 90 MMHG | OXYGEN SATURATION: 95 % | BODY MASS INDEX: 36.33 KG/M2 | SYSTOLIC BLOOD PRESSURE: 138 MMHG | HEART RATE: 82 BPM | WEIGHT: 267.86 LBS

## 2025-03-20 DIAGNOSIS — E78.5 DYSLIPIDEMIA: Primary | ICD-10-CM

## 2025-03-20 DIAGNOSIS — I10 BENIGN ESSENTIAL HYPERTENSION: ICD-10-CM

## 2025-03-20 LAB
25(OH)D3+25(OH)D2 SERPL-MCNC: 50 NG/ML (ref 30–100)
ATRIAL RATE: 85 BPM
CHOLEST SERPL-MCNC: 151 MG/DL
CHOLEST/HDLC SERPL: 3 (CALC)
HDLC SERPL-MCNC: 51 MG/DL
LDLC SERPL CALC-MCNC: 80 MG/DL (CALC)
NONHDLC SERPL-MCNC: 100 MG/DL (CALC)
P AXIS: 9 DEGREES
P OFFSET: 193 MS
P ONSET: 143 MS
PR INTERVAL: 144 MS
Q ONSET: 215 MS
QRS COUNT: 14 BEATS
QRS DURATION: 90 MS
QT INTERVAL: 368 MS
QTC CALCULATION(BAZETT): 437 MS
QTC FREDERICIA: 413 MS
R AXIS: 29 DEGREES
T AXIS: 10 DEGREES
T OFFSET: 399 MS
TRIGL SERPL-MCNC: 103 MG/DL
VENTRICULAR RATE: 85 BPM

## 2025-03-20 PROCEDURE — 99214 OFFICE O/P EST MOD 30 MIN: CPT | Performed by: INTERNAL MEDICINE

## 2025-03-20 PROCEDURE — 93005 ELECTROCARDIOGRAM TRACING: CPT | Performed by: INTERNAL MEDICINE

## 2025-03-20 PROCEDURE — 3080F DIAST BP >= 90 MM HG: CPT | Performed by: INTERNAL MEDICINE

## 2025-03-20 PROCEDURE — 3075F SYST BP GE 130 - 139MM HG: CPT | Performed by: INTERNAL MEDICINE

## 2025-03-20 PROCEDURE — 1036F TOBACCO NON-USER: CPT | Performed by: INTERNAL MEDICINE

## 2025-03-20 RX ORDER — CARVEDILOL 12.5 MG/1
12.5 TABLET ORAL
Qty: 180 TABLET | Refills: 3 | Status: SHIPPED | OUTPATIENT
Start: 2025-03-20 | End: 2026-03-20

## 2025-03-20 NOTE — PROGRESS NOTES
Chief Complaint:   Follow-up     History Of Present Illness:    Jeyson Wen is a 50 y.o. male presenting for follow-up.  Doing well from a cardiac standpoint.  Denies any progressive angina, shortness of breath, dizziness, palpitations.  Compliant with medications.     Last Recorded Vitals:  Vitals:    03/20/25 0818   BP: 138/90   Pulse: 82   SpO2: 95%   Weight: 121 kg (267 lb 13.7 oz)       Past Medical History:  He has a past medical history of Chronic rhinitis (11/10/2017), Complete rotator cuff tear or rupture of right shoulder, not specified as traumatic (07/28/2021), Contusion of unspecified lesser toe(s) without damage to nail, initial encounter (02/12/2021), Elevation of levels of liver transaminase levels (11/10/2017), Encounter for screening for cardiovascular disorders (06/20/2019), Furuncle, unspecified (01/19/2022), Hypertension (1/2006), Nondisplaced fracture of first metatarsal bone, right foot, initial encounter for closed fracture (02/22/2021), Other abnormality of red blood cells (02/02/2018), Pain in right hip (08/31/2016), Pain in right shoulder (10/13/2021), Personal history of diseases of the skin and subcutaneous tissue (04/16/2019), Personal history of other diseases of male genital organs (07/03/2019), Personal history of other diseases of the circulatory system (11/21/2014), Personal history of other diseases of the circulatory system (07/03/2019), Personal history of other diseases of the digestive system (03/26/2020), Personal history of other diseases of the nervous system and sense organs (06/25/2021), Personal history of other diseases of the respiratory system (12/20/2021), Personal history of other mental and behavioral disorders (11/21/2014), Pleurodynia (02/02/2015), Psoriasis, unspecified (06/20/2019), Pure hypercholesterolemia, unspecified (01/20/2021), Unspecified fracture of right toe(s), initial encounter for closed fracture (02/19/2021), Unspecified injury of right foot,  initial encounter (02/12/2021), Unspecified injury of unspecified wrist, hand and finger(s), initial encounter (01/19/2022), and Unspecified otitis externa, unspecified ear (07/23/2015).    Past Surgical History:  He has a past surgical history that includes Knee arthroscopy w/ debridement (11/21/2014); Other surgical history (07/26/2019); Other surgical history (07/26/2019); Other surgical history (10/19/2021); and Hip surgery (09/04/2015).      Social History:  He reports that he has never smoked. He has never used smokeless tobacco. He reports that he does not currently use alcohol. He reports that he does not currently use drugs.    Family History:  No family history on file.     Allergies:  Amoxicillin and Poison ivy    Outpatient Medications:  Current Outpatient Medications   Medication Instructions    albuterol (Ventolin HFA) 90 mcg/actuation inhaler 2 puffs, inhalation, Every 4 hours PRN    amLODIPine (NORVASC) 10 mg, oral, Daily, as directed    aspirin 81 mg, Daily    budesonide (Pulmicort Flexhaler) 180 mcg/actuation inhaler 1 puff, inhalation, 2 times daily RT, Rinse mouth with water after use to reduce aftertaste and incidence of candidiasis. Do not swallow.    carvedilol (COREG) 6.25 mg, oral, 2 times daily (morning and late afternoon)    cholecalciferol (Vitamin D-3) 5,000 Units tablet 1 tablet, Daily    hydrOXYzine HCL (ATARAX) 25 mg, oral, Every 6 hours PRN    lisinopriL-hydrochlorothiazide 20-25 mg tablet 1 tablet, oral, Daily    LORazepam (ATIVAN) 0.5 mg, oral, 3 times daily PRN    rosuvastatin (CRESTOR) 5 mg, oral, Daily    triamcinolone (Kenalog) 0.1 % cream Topical, 2 times daily, Apply to affected area 1-2 times daily as needed.       Physical Exam:  Constitutional:       Appearance: Healthy appearance. Not in distress.   Neck:      Vascular: No JVR. JVD normal.   Pulmonary:      Effort: Pulmonary effort is normal.      Breath sounds: Normal breath sounds. No wheezing. No rhonchi. No rales.    Chest:      Chest wall: Not tender to palpatation.   Cardiovascular:      Normal rate. Regular rhythm.      Murmurs: There is no murmur.   Edema:     Peripheral edema absent.   Abdominal:      General: Bowel sounds are normal.      Palpations: Abdomen is soft.      Tenderness: There is no abdominal tenderness.   Musculoskeletal: Normal range of motion. Skin:     General: Skin is warm and dry.   Neurological:      General: No focal deficit present.      Mental Status: Alert and oriented to person, place and time.           Last Labs:  CBC -  Lab Results   Component Value Date    WBC 6.9 06/24/2021    HGB 16.6 06/24/2021    HCT 49.0 06/24/2021    MCV 85 06/24/2021     06/24/2021       CMP -  Lab Results   Component Value Date    CALCIUM 9.8 06/24/2021    PROT 7.7 06/24/2021    ALBUMIN 5.1 (H) 06/24/2021    AST 53 (H) 06/24/2021    ALT 82 (H) 06/24/2021    ALKPHOS 76 06/24/2021    BILITOT 0.8 06/24/2021       LIPID PANEL -   Lab Results   Component Value Date    CHOL 151 03/19/2025    TRIG 103 03/19/2025    HDL 51 03/19/2025    CHHDL 3.0 03/19/2025    LDLF 152 (H) 06/24/2021    VLDL 38 06/24/2021    NHDL 100 03/19/2025       RENAL FUNCTION PANEL -   Lab Results   Component Value Date    GLUCOSE 87 06/24/2021     06/24/2021    K 4.2 06/24/2021     06/24/2021    CO2 30 06/24/2021    ANIONGAP 14 06/24/2021    BUN 16 06/24/2021    CREATININE 0.73 06/24/2021    CALCIUM 9.8 06/24/2021    ALBUMIN 5.1 (H) 06/24/2021        Lab Results   Component Value Date    HGBA1C 5.3 06/24/2021       Last Cardiology Tests:  ECG independently reviewed from today: Sinus rhythm, rate 85, no ST abnormalities    Renal artery U/S 4/1/24:  CONCLUSIONS:  Right Renal Artery: Right renal arteries demonstrate no evidence of hemodynamically significant stenosis. The right renal vein is widely patent.  Left Renal Artery: Left renal arteries demonstrate no evidence of hemodynamically significant stenosis. The left renal vein is  widely patent.    Abdominal U/S 4/1/24:  CONCLUSIONS:  Aorta/Common Iliac Arteries/IVC: The abdominal aorta demonstrates no evidence of aneurysm.     Imaging & Doppler Findings:     AORTA    AP    Lateral    PSV  Distal 1.90 cm 1.90 cm 103.0 cm/s         CAC score 7/2019:  LM 4.18, 2  LAD 68.22, 2  LCx 1.1, 1  RCA 22.45, 6     Total 95.95    Assessment/Plan   Mr. Wen is a very pleasant 49 year old male with a history of hypertension, dyslipidemia, strong FMHx of CAD presenting for CAD risk assessment. 10 year CAD risk with CAC Score included is 14%.     Doing well from a cardiac standpoint, blood pressure could be a little bit better controlled     Plan:  -tolerating rosuvastatin 5mg daily currently, LDL 80 -- history of pravastatin and rosuvastatin intolerance  -increase coreg to 12.5mg twice daily  -continue ASA 81mg daily, amlodipine, lisinopril-hydrochlorothiazide  -followup in one year or sooner if needed        Rafael Evans MD

## 2025-03-20 NOTE — TELEPHONE ENCOUNTER
----- Message from Nurse Isabel MARRERO sent at 3/20/2025  9:09 AM EDT -----    ----- Message -----  From: JESSICA Stevens-HAILY  Sent: 3/20/2025   8:52 AM EDT  To: Isabel Zendejas RN    Please call and review lipid panel is improved on the Crestor   Thanks  ----- Message -----  From: [facility] Results In  Sent: 3/20/2025   7:19 AM EDT  To: Rafael Evans MD

## 2025-05-27 ENCOUNTER — APPOINTMENT (OUTPATIENT)
Dept: PRIMARY CARE | Facility: CLINIC | Age: 50
End: 2025-05-27
Payer: COMMERCIAL

## 2025-05-27 VITALS
WEIGHT: 268 LBS | SYSTOLIC BLOOD PRESSURE: 138 MMHG | DIASTOLIC BLOOD PRESSURE: 90 MMHG | BODY MASS INDEX: 36.35 KG/M2 | OXYGEN SATURATION: 96 % | HEART RATE: 79 BPM | TEMPERATURE: 97.8 F

## 2025-05-27 DIAGNOSIS — E66.01 CLASS 2 SEVERE OBESITY WITH SERIOUS COMORBIDITY IN ADULT, UNSPECIFIED BMI, UNSPECIFIED OBESITY TYPE: ICD-10-CM

## 2025-05-27 DIAGNOSIS — E66.812 CLASS 2 SEVERE OBESITY WITH SERIOUS COMORBIDITY IN ADULT, UNSPECIFIED BMI, UNSPECIFIED OBESITY TYPE: ICD-10-CM

## 2025-05-27 DIAGNOSIS — G47.33 OSA (OBSTRUCTIVE SLEEP APNEA): Primary | ICD-10-CM

## 2025-05-27 DIAGNOSIS — L91.8 SKIN TAG: ICD-10-CM

## 2025-05-27 PROCEDURE — 3075F SYST BP GE 130 - 139MM HG: CPT

## 2025-05-27 PROCEDURE — 99214 OFFICE O/P EST MOD 30 MIN: CPT

## 2025-05-27 PROCEDURE — 3080F DIAST BP >= 90 MM HG: CPT

## 2025-05-27 NOTE — PROGRESS NOTES
Subjective   Patient ID: Jeyson Wen is a 50 y.o. male who presents for Wart (L Middle finger. Has tried OTC meds with no relief. ) and Follow-up (Would like to talk about cardiology appointment. ).  HPI    Eye complaint  --essentially resolved  --hordeolum    Wart of finger  --destruction with cryo see procedure note      Patient ID: Jeyson Wen is a 50 y.o. male.    Skin Tag Removal    Date/Time: 5/28/2025 2:45 PM    Performed by: ROSSANA Winn  Authorized by: ROSSANA Winn    Consent:     Consent obtained:  Verbal    Consent given by:  Patient    Risks, benefits, and alternatives were discussed: yes      Risks discussed:  Bleeding, infection, pain, nerve damage and poor cosmetic result    Alternatives discussed:  No treatment  Universal protocol:     Procedure explained and questions answered to patient or proxy's satisfaction: yes      Relevant documents present and verified: yes      Test results available: yes      Imaging studies available: no      Required blood products, implants, devices, and special equipment available: yes      Site/side marked: yes      Immediately prior to procedure, a time out was called: yes      Patient identity confirmed:  Verbally with patient  Indications:     Indications:  Pain, cosmetic concern  Pre-procedure details:     Skin preparation:  Antiseptic wash    Preparation: Patient was prepped and draped in the usual sterile fashion    Sedation:     Sedation type:  None  Anesthesia:     Anesthesia method:  None  Post-procedure details:     Procedure completion:  Tolerated well, no immediate complications      Vitals:    05/27/25 1608   BP: 138/90   Pulse: 79   Temp: 36.6 °C (97.8 °F)   SpO2: 96%       Review of Systems    Objective   Physical Exam    Assessment/Plan   Problem List Items Addressed This Visit       Obesity    Relevant Medications    tirzepatide, weight loss, (Zepbound) 2.5 mg/0.5 mL injection    MARYJANE (obstructive sleep apnea) -  Primary    Relevant Medications    tirzepatide, weight loss, (Zepbound) 2.5 mg/0.5 mL injection     Other Visit Diagnoses         Skin tag                     Thank you for coming in today, please call my office if you have any concerns or questions.     Dick LOPEZ, CNP

## 2025-06-10 DIAGNOSIS — I15.9 SECONDARY HYPERTENSION: ICD-10-CM

## 2025-06-10 RX ORDER — LISINOPRIL AND HYDROCHLOROTHIAZIDE 20; 25 MG/1; MG/1
1 TABLET ORAL DAILY
Qty: 90 TABLET | Refills: 1 | Status: SHIPPED | OUTPATIENT
Start: 2025-06-10

## 2025-06-27 ENCOUNTER — HOSPITAL ENCOUNTER (OUTPATIENT)
Dept: RADIOLOGY | Facility: HOSPITAL | Age: 50
Discharge: HOME | End: 2025-06-27
Payer: COMMERCIAL

## 2025-06-27 ENCOUNTER — OFFICE VISIT (OUTPATIENT)
Dept: ORTHOPEDIC SURGERY | Facility: HOSPITAL | Age: 50
End: 2025-06-27
Payer: COMMERCIAL

## 2025-06-27 DIAGNOSIS — E66.812 CLASS 2 SEVERE OBESITY WITH SERIOUS COMORBIDITY IN ADULT, UNSPECIFIED BMI, UNSPECIFIED OBESITY TYPE: ICD-10-CM

## 2025-06-27 DIAGNOSIS — M25.562 LEFT KNEE PAIN, UNSPECIFIED CHRONICITY: ICD-10-CM

## 2025-06-27 DIAGNOSIS — G47.33 OSA (OBSTRUCTIVE SLEEP APNEA): ICD-10-CM

## 2025-06-27 DIAGNOSIS — M17.32 POST-TRAUMATIC OSTEOARTHRITIS OF LEFT KNEE: Primary | ICD-10-CM

## 2025-06-27 DIAGNOSIS — E66.01 CLASS 2 SEVERE OBESITY WITH SERIOUS COMORBIDITY IN ADULT, UNSPECIFIED BMI, UNSPECIFIED OBESITY TYPE: ICD-10-CM

## 2025-06-27 PROCEDURE — 1036F TOBACCO NON-USER: CPT | Performed by: STUDENT IN AN ORGANIZED HEALTH CARE EDUCATION/TRAINING PROGRAM

## 2025-06-27 PROCEDURE — 99202 OFFICE O/P NEW SF 15 MIN: CPT | Performed by: STUDENT IN AN ORGANIZED HEALTH CARE EDUCATION/TRAINING PROGRAM

## 2025-06-27 PROCEDURE — 99204 OFFICE O/P NEW MOD 45 MIN: CPT | Performed by: STUDENT IN AN ORGANIZED HEALTH CARE EDUCATION/TRAINING PROGRAM

## 2025-06-27 PROCEDURE — 73564 X-RAY EXAM KNEE 4 OR MORE: CPT | Mod: LT

## 2025-06-27 PROCEDURE — 73564 X-RAY EXAM KNEE 4 OR MORE: CPT | Mod: LEFT SIDE | Performed by: RADIOLOGY

## 2025-06-27 NOTE — PROGRESS NOTES
PRIMARY CARE PHYSICIAN: Dick Ayala, JESSICA-CNP  REFERRING PROVIDER: No referring provider defined for this encounter.       SUBJECTIVE  CHIEF COMPLAINT:   Chief Complaint   Patient presents with    Left Knee - New Patient Visit, Pain        HPI: Jeyson Wen is a pleasant 50 y.o. year-old male who is seen today for evaluation of left knee pain.  The patient has a significant surgical history with respect to his knee.  In 1990, the patient had an injury while playing football.  He tore his ACL and meniscus.  He was treated with an ACL reconstruction with a donor graft and a meniscus repair.  He 1998 in 2004, the patient had subsequent arthroscopic surgery to address recurrent meniscus tears.    Currently, he rates his pain as a 4-5.  He is unable to perform certain activities.  He still able to exercise but feels limited.  He has tried a brace.  He is unable to walk more than 50 feet.  He has occasional bouts of instability.  He has noticed a progressive knock-knee deformity.    He has not tried intra-articular injections.    Surgical history is also significant for bilateral Marquis hip resurfacing's.  Left was done in 2012 and right in 2014.    No history of heart attack, stroke or blood clot.  No history of infection with any of his surgeries.    REVIEW OF SYSTEMS  The patient denies any fever, chills, chest pain, shortness of breath or difficulty breathing.  Patient denies any numbness, tingling, or radicular symptoms.  Adult patient history sheet was filled out by the patient today in clinic and will be scanned into the EMR.  I personally reviewed this form which will be scanned into the EMR.  This includes Past Medical History, Past Surgical History, Medications, Allergies, Social History, Family History and 12 point review of systems.    Medical History[1]     Allergies[2]     Surgical History[3]     Family History[4]     Social History     Socioeconomic History    Marital status:      Spouse  name: brian    Number of children: Not on file    Years of education: Not on file    Highest education level: Not on file   Occupational History    Not on file   Tobacco Use    Smoking status: Never    Smokeless tobacco: Never   Vaping Use    Vaping status: Never Used   Substance and Sexual Activity    Alcohol use: Not Currently    Drug use: Not Currently    Sexual activity: Yes     Partners: Female     Birth control/protection: I.U.D.   Other Topics Concern    Not on file   Social History Narrative    Not on file     Social Drivers of Health     Financial Resource Strain: Not on file   Food Insecurity: Not on file   Transportation Needs: Not on file   Physical Activity: Not on file   Stress: Not on file   Social Connections: Not on file   Intimate Partner Violence: Not At Risk (12/13/2023)    Humiliation, Afraid, Rape, and Kick questionnaire     Fear of Current or Ex-Partner: No     Emotionally Abused: No     Physically Abused: No     Sexually Abused: No   Housing Stability: Not on file        CURRENT MEDICATIONS:   Current Medications[5]     OBJECTIVE    PHYSICAL EXAM  There is no height or weight on file to calculate BMI.    General: Well-appearing male in no acute distress.  Awake, alert and oriented.  Pleasant and cooperative.  Respiratory: Non-labored breathing  Mood: Euthymic   Gait: Antalgic  Assistive Device: None     Affected Left Knee  Limb Alignment: About 10 degrees of valgus  ROM: 0-105  Stable to varus and valgus stress at full extension and 30 degrees of flexion.  Fixed deformity.  Skin: Intact, no abrasions or draining sinuses  Effusion: None  Quad Strength: 5/5  Hamstring Strength: 5/5  Patella Crepitus: None  Patella Grind: Positive  Tenderness: Lateral joint line  Sensation: Intact to light touch distally  Motor function: Able to fire TA, EHL, G/S  Pulses: Palpable DP pulse    Unaffected Right Knee  Skin: Intact  ROM: 0-120  Effusion: None  No tenderness to palpation on exam    IMAGING:  AP /  lateral/ mid-flexion/sunrise views: Independent review of left knee x-rays was performed. The findings were reviewed with the patient. There are severe degenerative changes of the left knee with valgus limb alignment. There is joint space narrowing, subchondral sclerosis, and osteophyte formation. No evidence of fracture, AVN, dislocation, osteomyelitis.        ASSESSMENT & PLAN    IMPRESSION:  Jeyson Wen is a 50 y.o. 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  Despite the patient's young age, I believe that the patient has advanced osteoarthritis and is also losing bone.  I recommend that we move forward with total joint replacement.  I do not believe that nonsurgical treatment of this condition would be appropriate given the advanced osteoarthritis that he has.    PLAN:  Jeyson Wen for more than six months has had limited function as well as persistent and severe pain which has negatively impacted the quality of life and interfered with activities of daily living. Under my care or the care of other providers, for greater than the three months, conservative treatment including activity modification, over the counter pain medications, injections, physical therapy and/or recommended home exercise program, have provided only minimal relief. The patient has not had an intra-articular injection in the past 3 months. The option to continue with conservative measures in lieu of arthroplasty was discussed and offered. However, given the failure of these conservative measures and the clinical and radiographic evidence of end-stage arthritis, the patient is a good candidate for an elective total knee arthroplasty. The stated potential benefits include pain relief and improved function were discussed but no guarantees were offered. Some patients may experience improved range of motion but pre-operative range of motion remains the strongest predictor of post-operative range of motion.     Plan  for [facility].  Overnight admission.  CPT 21654.  DePuy attune posterior stabilized implants.  Cemented.  Amari staple removal.    *This note was created using voice recognition software and was not corrected for typographical or grammatical errors.*                               [1]   Past Medical History:  Diagnosis Date    Chronic rhinitis 11/10/2017    Rhinitis    Complete rotator cuff tear or rupture of right shoulder, not specified as traumatic 07/28/2021    Complete tear of right rotator cuff    Contusion of unspecified lesser toe(s) without damage to nail, initial encounter 02/12/2021    Toe contusion    Elevation of levels of liver transaminase levels 11/10/2017    Elevated transaminase level    Encounter for screening for cardiovascular disorders 06/20/2019    Screening for cardiovascular condition    Furuncle, unspecified 01/19/2022    Boil    Hypertension 1/2006    Nondisplaced fracture of first metatarsal bone, right foot, initial encounter for closed fracture 02/22/2021    Closed nondisplaced fracture of first metatarsal bone of right foot, initial encounter    Other abnormality of red blood cells 02/02/2018    Low mean corpuscular volume (MCV)    Pain in right hip 08/31/2016    Right hip pain    Pain in right shoulder 10/13/2021    Right shoulder pain, unspecified chronicity    Personal history of diseases of the skin and subcutaneous tissue 04/16/2019    History of dermatitis    Personal history of other diseases of male genital organs 07/03/2019    History of epididymitis    Personal history of other diseases of the circulatory system 11/21/2014    History of hypertension    Personal history of other diseases of the circulatory system 07/03/2019    History of varicocele    Personal history of other diseases of the digestive system 03/26/2020    History of irritable bowel syndrome    Personal history of other diseases of the nervous system and sense organs 06/25/2021    History of  labyrinthitis    Personal history of other diseases of the respiratory system 12/20/2021    History of sinusitis    Personal history of other mental and behavioral disorders 11/21/2014    History of anxiety disorder    Pleurodynia 02/02/2015    Rib pain on left side    Psoriasis, unspecified 06/20/2019    Psoriasis    Pure hypercholesterolemia, unspecified 01/20/2021    Elevated LDL cholesterol level    Unspecified fracture of right toe(s), initial encounter for closed fracture 02/19/2021    Toe fracture, right    Unspecified injury of right foot, initial encounter 02/12/2021    Injury of toe on right foot, initial encounter    Unspecified injury of unspecified wrist, hand and finger(s), initial encounter 01/19/2022    Finger injury    Unspecified otitis externa, unspecified ear 07/23/2015    Otitis externa   [2]   Allergies  Allergen Reactions    Amoxicillin Hives and Itching    Poison Ivy Unknown   [3]   Past Surgical History:  Procedure Laterality Date    HIP SURGERY  09/04/2015    Hip Surgery    KNEE ARTHROSCOPY W/ DEBRIDEMENT  11/21/2014    Arthroscopy Knee Left    OTHER SURGICAL HISTORY  07/26/2019    Tonsillectomy with adenoidectomy    OTHER SURGICAL HISTORY  07/26/2019    [place] tooth extraction    OTHER SURGICAL HISTORY  10/19/2021    Rotator cuff repair   [4] No family history on file.  [5]   Current Outpatient Medications   Medication Sig Dispense Refill    amLODIPine (Norvasc) 10 mg tablet TAKE 1 TABLET (10 MG) BY MOUTH ONCE DAILY. AS DIRECTED 90 tablet 1    aspirin 81 mg EC tablet Take 1 tablet (81 mg) by mouth once daily.      carvedilol (Coreg) 12.5 mg tablet Take 1 tablet (12.5 mg) by mouth 2 times daily (morning and late afternoon). 180 tablet 3    cholecalciferol (Vitamin D-3) 5,000 Units tablet Take 1 tablet (5,000 Units) by mouth once daily.      hydrOXYzine HCL (Atarax) 25 mg tablet TAKE 1 TABLET (25 MG) BY MOUTH EVERY 6 HOURS IF NEEDED FOR ANXIETY. 90 tablet 0    lisinopriL-hydrochlorothiazide  20-25 mg tablet TAKE 1 TABLET BY MOUTH EVERY DAY 90 tablet 1    LORazepam (Ativan) 0.5 mg tablet Take 1 tablet (0.5 mg) by mouth 3 times a day as needed for anxiety for up to 10 days. 30 tablet 0    rosuvastatin (Crestor) 5 mg tablet Take 1 tablet (5 mg) by mouth once daily. 90 tablet 1    tirzepatide, weight loss, (Zepbound) 2.5 mg/0.5 mL injection Inject 2.5 mg under the skin every 7 days. 2 mL 0    triamcinolone (Kenalog) 0.1 % cream Apply topically 2 times a day. Apply to affected area 1-2 times daily as needed. 28.4 g 0     No current facility-administered medications for this visit.

## 2025-06-30 RX ORDER — TIRZEPATIDE 2.5 MG/.5ML
2.5 INJECTION, SOLUTION SUBCUTANEOUS
Qty: 2 ML | Refills: 0 | Status: SHIPPED | OUTPATIENT
Start: 2025-07-06

## 2025-07-09 DIAGNOSIS — E78.5 HYPERLIPIDEMIA, UNSPECIFIED HYPERLIPIDEMIA TYPE: ICD-10-CM

## 2025-07-09 RX ORDER — ROSUVASTATIN CALCIUM 5 MG/1
5 TABLET, COATED ORAL DAILY
Qty: 90 TABLET | Refills: 1 | Status: SHIPPED | OUTPATIENT
Start: 2025-07-09

## 2025-07-12 DIAGNOSIS — M17.12 PRIMARY OSTEOARTHRITIS OF LEFT KNEE: ICD-10-CM

## 2025-07-16 ENCOUNTER — OFFICE VISIT (OUTPATIENT)
Dept: URGENT CARE | Facility: URGENT CARE | Age: 50
End: 2025-07-16
Payer: COMMERCIAL

## 2025-07-16 VITALS
TEMPERATURE: 98 F | RESPIRATION RATE: 20 BRPM | HEART RATE: 66 BPM | DIASTOLIC BLOOD PRESSURE: 91 MMHG | BODY MASS INDEX: 34.89 KG/M2 | OXYGEN SATURATION: 96 % | SYSTOLIC BLOOD PRESSURE: 148 MMHG | WEIGHT: 257.28 LBS

## 2025-07-16 DIAGNOSIS — T16.2XXA FOREIGN BODY OF LEFT EAR, INITIAL ENCOUNTER: Primary | ICD-10-CM

## 2025-07-16 ASSESSMENT — ENCOUNTER SYMPTOMS
FEVER: 0
LOSS OF SENSATION IN FEET: 0
OCCASIONAL FEELINGS OF UNSTEADINESS: 0
FATIGUE: 0
CHILLS: 0
SHORTNESS OF BREATH: 0
DEPRESSION: 0

## 2025-07-16 ASSESSMENT — PAIN SCALES - GENERAL: PAINLEVEL_OUTOF10: 3

## 2025-07-16 NOTE — PROGRESS NOTES
Subjective   Patient ID: Jeyson Wen is a 50 y.o. male. They present today with a chief complaint of Other (Pt. C/O pain in left ear./Started this AM. ).    History of Present Illness  50 year old male presents with left ear discomfort. Reports he was using a q tip today after shower and when he pulled the q tip out, the end of it was missing. He thinks it is stuck in his ear.       History provided by:  Patient   used: No        Past Medical History  Allergies as of 07/16/2025 - Reviewed 07/16/2025   Allergen Reaction Noted    Amoxicillin Hives and Itching 08/05/2009    Poison ivy Unknown 06/17/2005       Prescriptions Prior to Admission[1]     Medical History[2]    Surgical History[3]     reports that he has never smoked. He has never used smokeless tobacco. He reports that he does not currently use alcohol. He reports that he does not currently use drugs.    Review of Systems  Review of Systems   Constitutional:  Negative for chills, fatigue and fever.   HENT:  Positive for ear pain.    Respiratory:  Negative for shortness of breath.    Cardiovascular:  Negative for chest pain.                                  Objective    Vitals:    07/16/25 1827   BP: (!) 148/91   BP Location: Left arm   Patient Position: Sitting   BP Cuff Size: Large adult   Pulse: 66   Resp: 20   Temp: 36.7 °C (98 °F)   TempSrc: Oral   SpO2: 96%   Weight: 117 kg (257 lb 4.4 oz)     No LMP for male patient.    Physical Exam  Vitals and nursing note reviewed.   Constitutional:       Appearance: Normal appearance.   HENT:      Right Ear: Hearing, tympanic membrane, ear canal and external ear normal.      Left Ear: Hearing, ear canal and external ear normal. Tympanic membrane is scarred.      Ears:      Comments: FB (cotton ball from end of q tip) present in left ear upon initial visualization. After FB removed, ear canal appears slightly irritated. TM unremarkable except scarring. Pt had tubes in ears as a child.      Neurological:      Mental Status: He is alert.         Foreign Body Removal - Orifice    Date/Time: 7/16/2025 6:47 PM    Performed by: ROSSANA Bruce  Authorized by: ROSSANA Bruce    Consent:     Consent obtained:  Verbal    Consent given by:  Patient    Risks, benefits, and alternatives were discussed: yes      Risks discussed:  Bleeding, TM perforation, pain, incomplete removal, damage to surrounding structures, worsening of condition and infection    Alternatives discussed:  No treatment, observation, referral, alternative treatment and delayed treatment  Universal protocol:     Patient identity confirmed:  Verbally with patient  Location:     Location:  Ear  Anesthesia:     Topical anesthetic:  None  Procedure details:     Localization method:  Direct visualization    Removal mechanism:  Alligator forceps    Procedure complexity:  Simple    Foreign bodies recovered:  1    Intact foreign body removal: yes    Post-procedure details:     Confirmation:  No additional foreign bodies on visualization    Procedure completion:  Tolerated  Comments:      End of q tip successfully removed from pt's left ear on first attempt       Point of Care Test & Imaging Results from this visit  No results found for this visit on 07/16/25.   Imaging  No results found.    Cardiology, Vascular, and Other Imaging  No other imaging results found for the past 2 days      Diagnostic study results (if any) were reviewed by ROSSANA Bruce.    Assessment/Plan   Allergies, medications, history, and pertinent labs/EKGs/Imaging reviewed by ROSSANA Bruce.     Medical Decision Making  HPI: See Narrative Above   HISTORIAN: Patient   INDEPENDENT HISTORIAN:   RECORDS REVIEWED:    DIFFERENTIAL DX: FB vs otitis media vs otitis externa   LABS:  XRAY:  EKG:    IN CLINIC MEDICATIONS: Offered in clinic RX medications for patient they declined stated they wanted it sent through their insurance through  their pharmacy.      CONSULTED WITH:    DIAGNOSIS: See urgent care course of treatment  SEE URGENT CARE COURSE OF TREATMENT AND DOCUMENTATION FOR RX MEDS GIVEN.     PROCEDURES: SEE PROCEDURE NOTE    PLAN: FB removed without difficulty. TM unremarkable post exam, canal slightly irritated. Patient reports instant relief of discomfort once cotton ball was removed. Advised followup if pain comes back or new symptoms like fever occurs. Patient agreeable to plan.     Patient and or family were counselled on labs, radiological studies, and all testing applicable for this visit as well as diagnosis. Patient was discharged home with stable non-toxic condition. They verbalized discharge instructions that were given to them BOTH written and verbally by myself.  They were given ample time to ask questions and have none at time of disposition.    Orders and Diagnoses  There are no diagnoses linked to this encounter.    Medical Admin Record      Patient disposition: Home    Electronically signed by ROSSANA Bruce  6:31 PM           [1] (Not in a hospital admission)  [2]   Past Medical History:  Diagnosis Date    Chronic rhinitis 11/10/2017    Rhinitis    Complete rotator cuff tear or rupture of right shoulder, not specified as traumatic 07/28/2021    Complete tear of right rotator cuff    Contusion of unspecified lesser toe(s) without damage to nail, initial encounter 02/12/2021    Toe contusion    Elevation of levels of liver transaminase levels 11/10/2017    Elevated transaminase level    Encounter for screening for cardiovascular disorders 06/20/2019    Screening for cardiovascular condition    Furuncle, unspecified 01/19/2022    Boil    Hypertension 1/2006    Nondisplaced fracture of first metatarsal bone, right foot, initial encounter for closed fracture 02/22/2021    Closed nondisplaced fracture of first metatarsal bone of right foot, initial encounter    Other abnormality of red blood cells 02/02/2018    Low  mean corpuscular volume (MCV)    Pain in right hip 08/31/2016    Right hip pain    Pain in right shoulder 10/13/2021    Right shoulder pain, unspecified chronicity    Personal history of diseases of the skin and subcutaneous tissue 04/16/2019    History of dermatitis    Personal history of other diseases of male genital organs 07/03/2019    History of epididymitis    Personal history of other diseases of the circulatory system 11/21/2014    History of hypertension    Personal history of other diseases of the circulatory system 07/03/2019    History of varicocele    Personal history of other diseases of the digestive system 03/26/2020    History of irritable bowel syndrome    Personal history of other diseases of the nervous system and sense organs 06/25/2021    History of labyrinthitis    Personal history of other diseases of the respiratory system 12/20/2021    History of sinusitis    Personal history of other mental and behavioral disorders 11/21/2014    History of anxiety disorder    Pleurodynia 02/02/2015    Rib pain on left side    Psoriasis, unspecified 06/20/2019    Psoriasis    Pure hypercholesterolemia, unspecified 01/20/2021    Elevated LDL cholesterol level    Unspecified fracture of right toe(s), initial encounter for closed fracture 02/19/2021    Toe fracture, right    Unspecified injury of right foot, initial encounter 02/12/2021    Injury of toe on right foot, initial encounter    Unspecified injury of unspecified wrist, hand and finger(s), initial encounter 01/19/2022    Finger injury    Unspecified otitis externa, unspecified ear 07/23/2015    Otitis externa   [3]   Past Surgical History:  Procedure Laterality Date    HIP SURGERY  09/04/2015    Hip Surgery    KNEE ARTHROSCOPY W/ DEBRIDEMENT  11/21/2014    Arthroscopy Knee Left    OTHER SURGICAL HISTORY  07/26/2019    Tonsillectomy with adenoidectomy    OTHER SURGICAL HISTORY  07/26/2019    [place] tooth extraction    OTHER SURGICAL HISTORY   10/19/2021    Rotator cuff repair

## 2025-07-17 ENCOUNTER — TELEPHONE (OUTPATIENT)
Dept: URGENT CARE | Facility: URGENT CARE | Age: 50
End: 2025-07-17

## 2025-07-17 ENCOUNTER — APPOINTMENT (OUTPATIENT)
Dept: PRIMARY CARE | Facility: CLINIC | Age: 50
End: 2025-07-17
Payer: COMMERCIAL

## 2025-07-24 ENCOUNTER — APPOINTMENT (OUTPATIENT)
Dept: RADIOLOGY | Facility: CLINIC | Age: 50
End: 2025-07-24
Payer: COMMERCIAL

## 2025-07-24 ENCOUNTER — HOSPITAL ENCOUNTER (OUTPATIENT)
Dept: RADIOLOGY | Facility: CLINIC | Age: 50
Discharge: HOME | End: 2025-07-24
Payer: COMMERCIAL

## 2025-07-24 ENCOUNTER — OFFICE VISIT (OUTPATIENT)
Dept: ORTHOPEDIC SURGERY | Facility: CLINIC | Age: 50
End: 2025-07-24
Payer: COMMERCIAL

## 2025-07-24 DIAGNOSIS — M17.12 PRIMARY OSTEOARTHRITIS OF LEFT KNEE: ICD-10-CM

## 2025-07-24 DIAGNOSIS — M17.10 PRIMARY OSTEOARTHRITIS OF KNEE, UNSPECIFIED LATERALITY: ICD-10-CM

## 2025-07-24 PROCEDURE — 99202 OFFICE O/P NEW SF 15 MIN: CPT | Performed by: STUDENT IN AN ORGANIZED HEALTH CARE EDUCATION/TRAINING PROGRAM

## 2025-07-24 PROCEDURE — 1036F TOBACCO NON-USER: CPT | Performed by: STUDENT IN AN ORGANIZED HEALTH CARE EDUCATION/TRAINING PROGRAM

## 2025-07-24 PROCEDURE — 77073 BONE LENGTH STUDIES: CPT

## 2025-07-24 PROCEDURE — 99214 OFFICE O/P EST MOD 30 MIN: CPT | Performed by: STUDENT IN AN ORGANIZED HEALTH CARE EDUCATION/TRAINING PROGRAM

## 2025-07-24 PROCEDURE — 73564 X-RAY EXAM KNEE 4 OR MORE: CPT | Mod: LT

## 2025-07-24 NOTE — PROGRESS NOTES
PRIMARY CARE PHYSICIAN: ROSSANA Winn  REFERRING PROVIDER: ROSSANA Winn  [address] [place] [address]       SUBJECTIVE  CHIEF COMPLAINT:   Chief Complaint   Patient presents with    Left Knee - Pre-op Visit        HPI: Jeyson Wen is a pleasant 50 y.o. year-old male who is seen today for preoperative consultation prior to upcoming left total knee arthroplasty.  The patient has a significant surgical history with respect to his knee.  In 1990, the patient had an injury while playing football.  He tore his ACL and meniscus.  He was treated with an ACL reconstruction with a donor graft and a meniscus repair.  He 1998 in 2004, the patient had subsequent arthroscopic surgery to address recurrent meniscus tears.    The patient states that he is ready to move forward with surgery.  He feels that his quality life and ability perform certain activities of daily living is negatively impacted.  He continues to have occasional instability.  His knock-knee deformity has not progressed since I last saw him but it has progressed over the past few years.    Surgical history is also significant for bilateral [place] hip resurfacing's.  Left was done in 2012 and right in 2014.    No history of heart attack, stroke or blood clot.  No history of infection with any of his surgeries.    REVIEW OF SYSTEMS  There has been no interval change in this patient's past medical, surgical, medications, allergies, family history or social history since the most recent visit to a provider within our department.  14 point review of systems was performed, reviewed, and negative except for pertinent positives documented in the history of present illness.    Medical History[1]     Allergies[2]     Surgical History[3]     Family History[4]     Social History     Socioeconomic History    Marital status:      Spouse name: brian    Number of children: Not on file    Years of education: Not on file    Highest  education level: Not on file   Occupational History    Not on file   Tobacco Use    Smoking status: Never    Smokeless tobacco: Never   Vaping Use    Vaping status: Never Used   Substance and Sexual Activity    Alcohol use: Not Currently    Drug use: Not Currently    Sexual activity: Yes     Partners: Female     Birth control/protection: I.U.D.   Other Topics Concern    Not on file   Social History Narrative    Not on file     Social Drivers of Health     Financial Resource Strain: Not on file   Food Insecurity: Not on file   Transportation Needs: Not on file   Physical Activity: Not on file   Stress: Not on file   Social Connections: Not on file   Intimate Partner Violence: Not At Risk (12/13/2023)    Humiliation, Afraid, Rape, and Kick questionnaire     Fear of Current or Ex-Partner: No     Emotionally Abused: No     Physically Abused: No     Sexually Abused: No   Housing Stability: Not on file        CURRENT MEDICATIONS:   Current Medications[5]     OBJECTIVE    PHYSICAL EXAM  There is no height or weight on file to calculate BMI.    General: Well-appearing male in no acute distress.  Awake, alert and oriented.  Pleasant and cooperative.  Respiratory: Non-labored breathing  Mood: Euthymic   Gait: Antalgic  Assistive Device: None     Affected Left Knee  Limb Alignment: About 10 degrees of valgus  ROM: 0-105  Stable to varus and valgus stress at full extension and 30 degrees of flexion.  Partially correctable at 30 degrees of flexion.  Unable to fully correct the deformity.  Skin: Intact, no abrasions or draining sinuses.  Oblique incision beginning about 1 cm distal to the top of the tibial tubercle and heading towards the medial joint line at about a 45 degree angle.  Effusion: None  Quad Strength: 5/5  Hamstring Strength: 5/5  Patella Crepitus: None  Patella Grind: Positive  Tenderness: Lateral joint line  Sensation: Intact to light touch distally  Motor function: Able to fire TA, EHL, G/S  Pulses: Palpable DP  pulse    IMAGING:  AP / lateral/ mid-flexion/sunrise views: Independent review of left knee x-rays was performed. The findings were reviewed with the patient. There are severe degenerative changes of the left knee with valgus limb alignment. There is joint space narrowing, subchondral sclerosis, and osteophyte formation. No evidence of fracture, AVN, dislocation, osteomyelitis.        ASSESSMENT & PLAN    IMPRESSION:  Jeyson Wen is a 50 y.o. 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  Despite the patient's young age, I believe that the patient has advanced osteoarthritis and is also losing bone.  I recommend that we move forward with total joint replacement.  I do not believe that nonsurgical treatment of this condition would be appropriate given the advanced osteoarthritis that he has.    PLAN:  Jeyson Wen for more than six months has had limited function as well as persistent and severe pain which has negatively impacted the quality of life and interfered with activities of daily living. Under my care or the care of other providers, for greater than the three months, conservative treatment including activity modification, over the counter pain medications, injections, physical therapy and/or recommended home exercise program, have provided only minimal relief. The patient has not had an intra-articular injection in the past 3 months. The option to continue with conservative measures in lieu of arthroplasty was discussed and offered. However, given the failure of these conservative measures and the clinical and radiographic evidence of end-stage arthritis, the patient is a good candidate for an elective total knee arthroplasty. The stated potential benefits include pain relief and improved function were discussed but no guarantees were offered. Some patients may experience improved range of motion but pre-operative range of motion remains the strongest predictor of post-operative  range of motion.     I talked with the patient at length about risks, limitations, benefits and alternatives to total knee replacement today. I reviewed risks and concerns including but not limited to implant wear, loosening, implant failure, infection, need for revision surgery, delayed wound healing, deep vein thrombosis, pulmonary embolism, stroke, other cardiopulmonary event, nerve or vascular injury, death and other medical and anesthetic complications of surgery. We talked about the potential for persistent pain following surgery since there are many possible causes for knee pain. We talked about limited range of motion following knee replacement and the importance of physical therapy and their motivation. In rare cases, temporary but sometimes permanent nerve dysfunction can occur. The patient was advised that knee replacement will only relieve pain that is coming from the knee. I reviewed dislocation precautions and activity restrictions in detail. We discussed the concerns about intraoperative fracture and cemented versus cement-less implants.  We discussed the possible need for a blood transfusion. We discussed the fact that many of our patients are able to go home in 1 day or the same day depending on their health, mobility, pre-op preparation, individual home situation and personal preference. The patient should take our pre-operative teaching class. All of the patients questions were answered. The patient can call my office to schedule surgery and the pre-op teaching class. I told the patient that they should contact their primary care physician to discuss fitness for surgery. The patient was also encouraged to get dental clearance prior to surgery.     The patient acknowledged a clear understanding of these issues and expressed the desire to proceed with surgery once medical clearance has been obtained.    The patient has identified their personal goals of their joint replacement surgery and recovery  and we have discussed them. In addition, we have discussed the advantages and disadvantages of various implant and fixation options, as well as various surgical approaches. The basic concepts of the joint replacement procedure has been reviewed with the patient and the patient has been provided the opportunity to see an actual implant either in the office or in our pre-op education class.    I also discussed with the patient the risks of being in the hospital environment in the setting of COVID-19. This risk was considered in light of the overall risk and benefit discussion related to the surgery. The patient's symptoms are severe and worsening, and cause an inability to perform activities of daily living. All possible precautions will be taken and length of stay will be limited as much as possible. The patient is fully aware of this after complete discussion and would like to proceed.    The patient has the following comorbidities that increase the risk of infection following joint replacement surgery: Obesity, multiple previous surgeries on his left knee. This was explicitly discussed with the patient and they would like to proceed with surgery.     With respect to his range of motion, the patient understands that he will have to work hard to improve his range of motion postoperatively.  He understands that his left knee will never bend as freely as his right but we are hoping to improve his range of motion as compared to what he has now.    Surgical plan: Left total knee arthroplasty   Implants: Depuy Attune, posterior stabilized  Special equipment: None  Alcohol or Tobacco Screen: Not applicable  LEONELA: No  DVT prophylaxis:  Aspirin 81 mg BID for 4 weeks   Drugs to stop: none  Allergies to antibiotics: none  Antibiotic Plan: Ancef (+/- vancomycin pending MRSA screen)  Special clearance needed: Per preadmission testing  Candidate for Outpatient: No  Meds-to-beds: Not discussed with patient  Pain medication post op:  Standard: Oxycodone, Tramadol and Tylenol  DME Recommendations: [employer], Thigh high compression stocking, Walker or Crutches depending on patient preference     *This office note was dictated using Dragon voice to text software and was not proofread for spelling or grammatical errors *      *This note was created using voice recognition software and was not corrected for typographical or grammatical errors.*                               [1]   Past Medical History:  Diagnosis Date    Chronic rhinitis 11/10/2017    Rhinitis    Complete rotator cuff tear or rupture of right shoulder, not specified as traumatic 07/28/2021    Complete tear of right rotator cuff    Contusion of unspecified lesser toe(s) without damage to nail, initial encounter 02/12/2021    Toe contusion    Elevation of levels of liver transaminase levels 11/10/2017    Elevated transaminase level    Encounter for screening for cardiovascular disorders 06/20/2019    Screening for cardiovascular condition    Furuncle, unspecified 01/19/2022    Boil    Hypertension 1/2006    Nondisplaced fracture of first metatarsal bone, right foot, initial encounter for closed fracture 02/22/2021    Closed nondisplaced fracture of first metatarsal bone of right foot, initial encounter    Other abnormality of red blood cells 02/02/2018    Low mean corpuscular volume (MCV)    Pain in right hip 08/31/2016    Right hip pain    Pain in right shoulder 10/13/2021    Right shoulder pain, unspecified chronicity    Personal history of diseases of the skin and subcutaneous tissue 04/16/2019    History of dermatitis    Personal history of other diseases of male genital organs 07/03/2019    History of epididymitis    Personal history of other diseases of the circulatory system 11/21/2014    History of hypertension    Personal history of other diseases of the circulatory system 07/03/2019    History of varicocele    Personal history of other diseases of the digestive system  03/26/2020    History of irritable bowel syndrome    Personal history of other diseases of the nervous system and sense organs 06/25/2021    History of labyrinthitis    Personal history of other diseases of the respiratory system 12/20/2021    History of sinusitis    Personal history of other mental and behavioral disorders 11/21/2014    History of anxiety disorder    Pleurodynia 02/02/2015    Rib pain on left side    Psoriasis, unspecified 06/20/2019    Psoriasis    Pure hypercholesterolemia, unspecified 01/20/2021    Elevated LDL cholesterol level    Unspecified fracture of right toe(s), initial encounter for closed fracture 02/19/2021    Toe fracture, right    Unspecified injury of right foot, initial encounter 02/12/2021    Injury of toe on right foot, initial encounter    Unspecified injury of unspecified wrist, hand and finger(s), initial encounter 01/19/2022    Finger injury    Unspecified otitis externa, unspecified ear 07/23/2015    Otitis externa   [2]   Allergies  Allergen Reactions    Amoxicillin Hives and Itching    Poison Ivy Unknown   [3]   Past Surgical History:  Procedure Laterality Date    HIP SURGERY  09/04/2015    Hip Surgery    KNEE ARTHROSCOPY W/ DEBRIDEMENT  11/21/2014    Arthroscopy Knee Left    OTHER SURGICAL HISTORY  07/26/2019    Tonsillectomy with adenoidectomy    OTHER SURGICAL HISTORY  07/26/2019    [place] tooth extraction    OTHER SURGICAL HISTORY  10/19/2021    Rotator cuff repair   [4] No family history on file.  [5]   Current Outpatient Medications   Medication Sig Dispense Refill    amLODIPine (Norvasc) 10 mg tablet TAKE 1 TABLET (10 MG) BY MOUTH ONCE DAILY. AS DIRECTED 90 tablet 1    aspirin 81 mg EC tablet Take 1 tablet (81 mg) by mouth once daily.      carvedilol (Coreg) 12.5 mg tablet Take 1 tablet (12.5 mg) by mouth 2 times daily (morning and late afternoon). 180 tablet 3    cholecalciferol (Vitamin D-3) 5,000 Units tablet Take 1 tablet (5,000 Units) by mouth once daily.       hydrOXYzine HCL (Atarax) 25 mg tablet TAKE 1 TABLET (25 MG) BY MOUTH EVERY 6 HOURS IF NEEDED FOR ANXIETY. 90 tablet 0    lisinopriL-hydrochlorothiazide 20-25 mg tablet TAKE 1 TABLET BY MOUTH EVERY DAY 90 tablet 1    LORazepam (Ativan) 0.5 mg tablet Take 1 tablet (0.5 mg) by mouth 3 times a day as needed for anxiety for up to 10 days. 30 tablet 0    rosuvastatin (Crestor) 5 mg tablet TAKE 1 TABLET BY MOUTH EVERY DAY 90 tablet 1    tirzepatide, weight loss, (Zepbound) 2.5 mg/0.5 mL injection INJECT 2.5 MG SUBCUTANEOUSLY EVERY 7 DAYS 2 mL 0    triamcinolone (Kenalog) 0.1 % cream Apply topically 2 times a day. Apply to affected area 1-2 times daily as needed. 28.4 g 0     No current facility-administered medications for this visit.

## 2025-07-28 ENCOUNTER — APPOINTMENT (OUTPATIENT)
Dept: PRIMARY CARE | Facility: CLINIC | Age: 50
End: 2025-07-28
Payer: COMMERCIAL

## 2025-07-31 ENCOUNTER — EDUCATION (OUTPATIENT)
Dept: ORTHOPEDIC SURGERY | Facility: CLINIC | Age: 50
End: 2025-07-31
Payer: COMMERCIAL

## 2025-07-31 NOTE — GROUP NOTE
In addition to the group class activities, Jeyson Wen had the following lab work completed:  No orders of the defined types were placed in this encounter.    Jeyson Wen  attended joint class on 7/31 and Did not bring a care partner to the class. The preop survey was completed and the patient provided attestation that they understand the content reviewed and that they do have a care partner identified to assist with recovery. Patient was provided with a folder of materials and instructed to call orthopedic navigator with questions.   A new History and Physical was not completed.    This class lasted approximately 2 hours and had 12 participants.   Thank you for attending our Joint Replacement class today in preparation for your upcoming surgery.  Topics discussed include:    MyChart Enrollment  Communication with Care Team  My Chart is the best form of communication to reach all of your caregivers  You can send messages to specific care givers, or a care team  Continued Education  You will be enrolled in a Total Joint Replacement care plan to receive additional education before and after surgery  You can review a short recording of the class content  Access to Medical Records  You can access test results, office notes, appointments, etc.  You can connect to other healthcare systems who use [employer] ([facility], [facility], [facility], etc.)  [email or web address]  Program Information  Using Meds to Beds is our standard process for joint replacements at [facility] to minimize issues with homegoing medications. Please let us know ahead of time if there is a reason why Meds to Beds cannot be used    Background/Understanding of Joint Replacement Surgery  Potential for same day discharge  Any questions or concerns about your specific surgery/plan are to be directed to the surgeon's office    How to Prepare for Surgery  Use of Nicotine Products/Smoking  Stop several weeks before surgery  Such products slow down the healing process and  increase risk of post-op infection and complications  Clearance for Surgery  Medical Clearance by Specialists  Dental Clearance  Cracked/Broken/Loose teeth left untreated may postpone surgery  The importance of post-op antibiotics for dental visits per surgeon protocol  Preadmission Testing  **Potential for postponed surgery if appropriate clearance is not obtained  Medication Instruction  Follow instructions provided by the doctor who prescribes your medication (typically, but not limited to cardiologist)  Preadmission testing will provide additional instructions during your appointment on what to stop and what to take as you get closer to surgery  For clarification of these instructions, please call preadmission testing directly - [phone]  Tips for Preparing the home for discharge from the hospital  Care Partner  Requirement for surgery, the patient must have a plan to have help at home  Potential for postponed surgery if plan for home support cannot be established  How the care partner can help after surgery  CHG Body Wash/Mouth Wash  Follow the instructions given at preadmission testing  Body wash is to be used on the body and hair for 5 washes  Mouthwash is to be used the night before and morning of surgery  **This is a system-wide protocol developed by infectious disease professionals, we will not alter our recommendations for those with sensitive skin or those who have special hair needs.  Please follow the instructions as they are written as this will provide the best infection prevention measures for surgery.  Should you have an allergy to one of the products, please discuss with your preadmission team**    What to Expect in the Hospital/At Home  Morning of Surgery NPO Guidelines  Nothing to eat after midnight  Water can be consumed up to 2 hours prior to arrival  Surgical and Post-Surgical Care Team  Surgical Team  Anesthesia Team  Nursing  Physical Therapy  Care [facility]  Arrival Instructions  Arrive at the time provided to you  Consider traffic patterns (rush-hour) based on arrival time  Have arrangements made for a ride home  If discharging same day, care partner should remain at the hospital  Recovering after Surgery  Recovery Room - Visitors are not brought back  Transition to hospital room - 2nd Floor, Visitors will be directed to your room  The presence of and strategies for controlling surgical pain and swelling  The importance of early mobility  Side effects after surgery  What to expect if staying overnight    Discharge Planning  The intended plan for discharge will be for patients to discharge home  All patients require a care partner (family, friend, neighbor, etc.) to stay with the patient for the first few nights after surgery  The inability to secure help at home will postpone surgery  Home Care Services set up per surgeon order  Physical Therapy  Occupational Therapy  **If desired, private duty care can be arranged by the patient ahead of time**  Outpatient Physical Therapy per surgeon order    Recovering at Home  Wound Care  Follow wound care instructions found in your discharge paperwork  Bandage is water-resistant and you may shower with the bandage  Do not scrub directly over the bandage  Do not submerge in water until cleared (bathtub, hot tub, pool, etc.)    Post-Op Risk Prevention  Infection Prevention  Promptly seek treatment for any infections post-operatively  Routine dental visits must be postponed for 3 months after surgery  Your surgeon may require antibiotics prior to future dental visits  Any concerns for infection not related directly to the knee or the hip should be managed by your primary care provider  Blood Clots  Be sure to complete the course of blood thinning medication as prescribed by your surgeon  Movement every 1-2 hours during the day is encouraged to prevent blood clots  Monitor for signs of blood clots  Wear compression stockings as  prescribed by your surgeon  Constipation  Constipation is common following surgery  Drink plenty of fluids  Take stool softener/laxative as prescribed by your surgeon  Move around frequently  Eat foods high in fiber  Fall Prevention  Prepare home ahead of time to clear space to move with walker  Remove throw rugs and electrical cords from walkways  Install railings near any stairways with more than 2 steps  Use night lights for increased visibility at night  Continue to use your assistive device until cleared by surgeon or physical therapy  Dislocation Prevention - Not all procedures will have dislocation precautions  Follow dislocation precautions provided by your surgeon  It is OK to resume sexual activity about 6 weeks following surgery  Be sure to follow any dislocation precautions assigned    Durable Medical Equipment  Cold Therapy  Breg Cold Therapy Machines  Ice/Gel Packs  Assistive Devices  Folding Walker with Wheels (in the front only)  No Rollators  Crutches if approved by Physical Therapy and Surgeon after surgery  Hip Kits  Raised Toilet Seats  Additional Compression Stockings    Joint Preservation  Healthy Activities when Cleared  Walking  Swimming  Bike Riding  Activities to Avoid  Refrain from repetitive motions which have a high impact on the joint  Gradual Progression  Progress activity slowly, listen to your body  Common Findings - NORMAL after surgery  Clicking/Grinding  Numbness near incision    Physical Therapy  Prehabilitation exercises  START TODAY ON BOTH LEGS  Surgery Specific Precautions  Follow surgery specific precautions found in your discharge paperwork    Follow-Up Visit  All patients will see their surgeon for a follow up visit after surgery  The visit may range from 2-6 weeks after surgery and is surgeon specific      Please don't hesitate to reach out if you have any additional questions or concerns.    Que Duval BSN, RN  Cyndy Gregorio BSN, RN-BC  Orthopedic Nurses  [place]  [facility]   [phone]

## 2025-08-04 ENCOUNTER — CLINICAL SUPPORT (OUTPATIENT)
Dept: PREADMISSION TESTING | Facility: HOSPITAL | Age: 50
End: 2025-08-04
Payer: COMMERCIAL

## 2025-08-04 ENCOUNTER — TELEPHONE (OUTPATIENT)
Dept: ORTHOPEDIC SURGERY | Facility: HOSPITAL | Age: 50
End: 2025-08-04
Payer: COMMERCIAL

## 2025-08-04 RX ORDER — CHOLECALCIFEROL (VITAMIN D3) 25 MCG
25 TABLET ORAL DAILY
COMMUNITY

## 2025-08-04 RX ORDER — BISMUTH SUBSALICYLATE 262 MG
1 TABLET,CHEWABLE ORAL DAILY
COMMUNITY

## 2025-08-04 NOTE — CPM/PAT NURSE NOTE
CPM/PAT Nurse Note      Name: Jeyson Wen (Jeyson Wen)  /Age: 1975/50 y.o.       Medical History[1]    Surgical History[2]    Patient  reports being sexually active and has had partner(s) who are female. He reports using the following method of birth control/protection: I.U.D..    Family History[3]    Allergies[4]    Prior to Admission medications   Medication Sig Start Date End Date Taking? Authorizing Provider   amLODIPine (Norvasc) 10 mg tablet TAKE 1 TABLET (10 MG) BY MOUTH ONCE DAILY. AS DIRECTED 25  ROSSANA Winn   aspirin 81 mg EC tablet Take 1 tablet (81 mg) by mouth once daily.    Historical Provider, MD   carvedilol (Coreg) 12.5 mg tablet Take 1 tablet (12.5 mg) by mouth 2 times daily (morning and late afternoon). 3/20/25 3/20/26  Rafael Evans MD   cholecalciferol (Vitamin D3) 25 mcg (1,000 units) tablet Take 1 tablet (25 mcg) by mouth once daily.    Historical Provider, MD   DIETARY SUPPLEMENT ORAL Take by mouth once daily. PROBIOTIC    Historical Provider, MD   hydrOXYzine HCL (Atarax) 25 mg tablet TAKE 1 TABLET (25 MG) BY MOUTH EVERY 6 HOURS IF NEEDED FOR ANXIETY.  Patient not taking: Reported on 2025  ROSSANA Winn   lisinopriL-hydrochlorothiazide 20-25 mg tablet TAKE 1 TABLET BY MOUTH EVERY DAY 6/10/25   ROSSANA Winn   LORazepam (Ativan) 0.5 mg tablet Take 1 tablet (0.5 mg) by mouth 3 times a day as needed for anxiety for up to 10 days. 3/4/24 5/27/25  ROSSANA Winn   multivitamin tablet Take 1 tablet by mouth once daily.    Historical Provider, MD   rosuvastatin (Crestor) 5 mg tablet TAKE 1 TABLET BY MOUTH EVERY DAY 25   ROSSANA Winn   tirzepatide, weight loss, (Zepbound) 2.5 mg/0.5 mL injection INJECT 2.5 MG SUBCUTANEOUSLY EVERY 7 DAYS  Patient taking differently: Inject 2.5 mg under the skin 1 (one) time per week. Sundays 7/6/25   Dick Ayala, JESSICA-HAILY   triamcinolone  (Kenalog) 0.1 % cream Apply topically 2 times a day. Apply to affected area 1-2 times daily as needed. 1/16/25   Dick Ayala APRN-CNP   cholecalciferol (Vitamin D-3) 5,000 Units tablet Take 1 tablet (5,000 Units) by mouth once daily. 11/10/17 8/4/25  Historical Provider, MD ABHISHEK DESIR Risk Score    No data to display  Caprini DVT Assessment    No data to display  Modified Frailty Index    No data to display  [id number] Stroke Risk Points  Current as of just now        N/A 0 to 9 Points:      Last Change: N/A          The UQQ7QC4-RKEp risk score (Lip GH, et al. 2009. © 2010 American College [employer]) quantifies the risk of stroke for a patient with atrial fibrillation. For patients without atrial fibrillation or under the age of 18 this score appears as N/A. Higher score values generally indicate higher risk of stroke.        This score is not applicable to this patient. Components are not calculated.          Revised Cardiac Risk Index    No data to display  Apfel Simplified Score    No data to display  Risk Analysis Index Results This Encounter    No data found in the last 10 encounters.       Prodigy: High Risk  Total Score: 8              Prodigy Gender Score          ARISCAT Score for Postoperative Pulmonary Complications    No data to display  Nelson Perioperative Risk for Myocardial Infarction or Cardiac Arrest (MATHEW)    No data to display      Nurse Plan of Action: RN screening call complete.  Reviewed allergies, medications and pharmacy, medical, surgical and social history with patient.  Chart updated.  Instructed patient to stop NSAIDS, vitamins and supplements 7 days prior to surgery.                  [1]   Past Medical History:  Diagnosis Date    Agatston CAC score, <100 06/26/2019    LM 4.18, 2 LAD 68.22, 2 LCx 1.1, 1 RCA 22.45, 6   Total 95.95    Anxiety     Cardiology follow-up encounter     Rafael Evans MD  LOV 3/20/25    Fatty liver     Hyperlipidemia      Hypertension     IFG (impaired fasting glucose)     Obesity     Primary osteoarthritis of left knee     Plan: Left Knee Total Arthroplasty  8/14/25    Psoriasis     Sleep apnea     Vitamin D deficiency    [2]   Past Surgical History:  Procedure Laterality Date    ADENOIDECTOMY      HIP ARTHROPLASTY Bilateral     left hip 2012 & right hip 2017    KNEE ARTHROSCOPY W/ DEBRIDEMENT Left 2012    ROTATOR CUFF REPAIR      TONSILLECTOMY      WISDOM TOOTH EXTRACTION     [3]   Family History  Problem Relation Name Age of Onset    Lung cancer Mother      Arthritis Mother      Heart disease Father          valve replacement    Other (aaa) Father      Prostate cancer Father      Hyperlipidemia Sister      Hyperlipidemia Brother     [4]   Allergies  Allergen Reactions    Amoxicillin Hives and Itching    Poison Ivy Rash

## 2025-08-04 NOTE — TELEPHONE ENCOUNTER
Called patient to discuss upcoming surgery. Confirmed that the plan is for an overnight hospital stay. The patient has obtained their medical equipment. The patient does have a care partner to assist them at home postoperatively. They live in a house.  The patient does not have stairs required for navigating the home. The patient currently does not use an assistive device. Reminded patient to follow PAT instructions leading up to surgery and to watch for a phone call from preop the day prior to their surgery to receive details about arrival time. All questions answered at this time.

## 2025-08-05 ENCOUNTER — APPOINTMENT (OUTPATIENT)
Dept: PRIMARY CARE | Facility: CLINIC | Age: 50
End: 2025-08-05
Payer: COMMERCIAL

## 2025-08-05 VITALS
HEART RATE: 66 BPM | HEIGHT: 72 IN | BODY MASS INDEX: 33.97 KG/M2 | OXYGEN SATURATION: 96 % | WEIGHT: 250.8 LBS | DIASTOLIC BLOOD PRESSURE: 80 MMHG | SYSTOLIC BLOOD PRESSURE: 124 MMHG | TEMPERATURE: 97.9 F

## 2025-08-05 DIAGNOSIS — K76.0 FATTY LIVER: ICD-10-CM

## 2025-08-05 DIAGNOSIS — Z12.11 ENCOUNTER FOR COLORECTAL CANCER SCREENING: ICD-10-CM

## 2025-08-05 DIAGNOSIS — Z00.00 HEALTHCARE MAINTENANCE: Primary | ICD-10-CM

## 2025-08-05 DIAGNOSIS — E66.812 CLASS 2 SEVERE OBESITY WITH SERIOUS COMORBIDITY IN ADULT, UNSPECIFIED BMI, UNSPECIFIED OBESITY TYPE: ICD-10-CM

## 2025-08-05 DIAGNOSIS — Z12.5 ENCOUNTER FOR SCREENING FOR MALIGNANT NEOPLASM OF PROSTATE: ICD-10-CM

## 2025-08-05 DIAGNOSIS — E66.01 CLASS 2 SEVERE OBESITY WITH SERIOUS COMORBIDITY IN ADULT, UNSPECIFIED BMI, UNSPECIFIED OBESITY TYPE: ICD-10-CM

## 2025-08-05 DIAGNOSIS — Z12.12 ENCOUNTER FOR COLORECTAL CANCER SCREENING: ICD-10-CM

## 2025-08-05 DIAGNOSIS — G47.33 OSA (OBSTRUCTIVE SLEEP APNEA): ICD-10-CM

## 2025-08-05 PROCEDURE — 3079F DIAST BP 80-89 MM HG: CPT

## 2025-08-05 PROCEDURE — 3074F SYST BP LT 130 MM HG: CPT

## 2025-08-05 PROCEDURE — 3008F BODY MASS INDEX DOCD: CPT

## 2025-08-05 PROCEDURE — 99396 PREV VISIT EST AGE 40-64: CPT

## 2025-08-05 PROCEDURE — 1036F TOBACCO NON-USER: CPT

## 2025-08-05 RX ORDER — TIRZEPATIDE 2.5 MG/.5ML
2.5 INJECTION, SOLUTION SUBCUTANEOUS
Qty: 2 ML | Refills: 0 | Status: SHIPPED | OUTPATIENT
Start: 2025-08-05 | End: 2025-08-06

## 2025-08-05 ASSESSMENT — ENCOUNTER SYMPTOMS
ALLERGIC/IMMUNOLOGIC NEGATIVE: 1
RESPIRATORY NEGATIVE: 1
PSYCHIATRIC NEGATIVE: 1
ABDOMINAL PAIN: 0
WEAKNESS: 0
UNEXPECTED WEIGHT CHANGE: 0
ACTIVITY CHANGE: 0
FEVER: 0
GASTROINTESTINAL NEGATIVE: 1
HEADACHES: 0
FATIGUE: 0
CARDIOVASCULAR NEGATIVE: 1
ARTHRALGIAS: 1
DIZZINESS: 0
ENDOCRINE NEGATIVE: 1
SHORTNESS OF BREATH: 0

## 2025-08-05 ASSESSMENT — PROMIS GLOBAL HEALTH SCALE
EMOTIONAL_PROBLEMS: NEVER
RATE_AVERAGE_FATIGUE: MILD
CARRYOUT_SOCIAL_ACTIVITIES: GOOD
RATE_MENTAL_HEALTH: EXCELLENT
RATE_QUALITY_OF_LIFE: EXCELLENT
CARRYOUT_PHYSICAL_ACTIVITIES: MODERATELY
RATE_GENERAL_HEALTH: VERY GOOD
RATE_SOCIAL_SATISFACTION: GOOD
RATE_PHYSICAL_HEALTH: VERY GOOD
RATE_AVERAGE_PAIN: 3

## 2025-08-05 ASSESSMENT — PATIENT HEALTH QUESTIONNAIRE - PHQ9
SUM OF ALL RESPONSES TO PHQ9 QUESTIONS 1 AND 2: 0
2. FEELING DOWN, DEPRESSED OR HOPELESS: NOT AT ALL
1. LITTLE INTEREST OR PLEASURE IN DOING THINGS: NOT AT ALL

## 2025-08-05 NOTE — PROGRESS NOTES
Subjective   Patient ID: Jeyson Wen is a 50 y.o. male who presents for Annual Exam (Due for DM screening, Hep C screening, PSA screening/Vaccines: Hep A, Dtap, Zoster, Pneumococcal).  Subjective  Jeyson Wen is a 50 y.o. male and is here for a comprehensive physical exam. The patient reports no problems.    Do you take any herbs or supplements that were not prescribed by a doctor? no  Are you taking calcium supplements? no  Are you taking aspirin daily? No  Taking probiotic, vitamin D, multivitamin.       Do you have pain that bothers you in your daily life? no  [unfilled]     Objective  [unfilled]    Assessment/Plan  Healthy male exam.      1. Sees dentist and eye doc yearly  2. Patient Counseling:  --Nutrition: Stressed importance of moderation in sodium/caffeine intake, saturated fat and cholesterol, caloric balance, sufficient intake of fresh fruits, vegetables, fiber, calcium, iron, and 1 mg of folate supplement per day (for females capable of pregnancy).  --Exercise: Stressed the importance of regular exercise.   --Substance Abuse: Discussed cessation/primary prevention of tobacco, alcohol, or other drug use; driving or other dangerous activities under the influence; availability of treatment for abuse.    --Sexuality: Discussed sexually transmitted diseases, partner selection, use of condoms, avoidance of unintended pregnancy  and contraceptive alternatives.   --Injury prevention: Discussed safety belts, safety helmets, smoke detector, smoking near bedding or upholstery.   --Dental health: Discussed importance of regular tooth brushing, flossing, and dental visits.  --Immunizations reviewed.  --Discussed benefits of screening colonoscopy.  --After hours service discussed with patient  3. Discussed the patient's BMI with him.  The BMI is in the acceptable range.  4. Follow up as needed for acute illness          Vitals:    08/05/25 0807   BP: 124/80   Pulse: 66   Temp: 36.6 °C (97.9 °F)   SpO2: 96%        Review of Systems   Constitutional:  Negative for activity change, fatigue, fever and unexpected weight change.   HENT: Negative.     Respiratory: Negative.  Negative for shortness of breath.    Cardiovascular: Negative.  Negative for chest pain.   Gastrointestinal: Negative.  Negative for abdominal pain.   Endocrine: Negative.    Musculoskeletal:  Positive for arthralgias and gait problem.        Knee pain, having arthroscopy next week   Skin: Negative.    Allergic/Immunologic: Negative.    Neurological:  Negative for dizziness, weakness and headaches.   Psychiatric/Behavioral: Negative.         Objective   Physical Exam  Vitals and nursing note reviewed.   Constitutional:       Appearance: Normal appearance.   HENT:      Head: Normocephalic.      Mouth/Throat:      Mouth: Mucous membranes are moist.     Cardiovascular:      Rate and Rhythm: Normal rate and regular rhythm.      Pulses: Normal pulses.      Heart sounds: Normal heart sounds. No murmur heard.     No friction rub. No gallop.   Pulmonary:      Effort: Pulmonary effort is normal. No respiratory distress.      Breath sounds: Normal breath sounds. No wheezing.   Abdominal:      General: Bowel sounds are normal. There is no distension.      Palpations: Abdomen is soft.      Tenderness: There is no abdominal tenderness.     Musculoskeletal:         General: No deformity. Normal range of motion.     Skin:     General: Skin is warm and dry.      Capillary Refill: Capillary refill takes less than 2 seconds.     Neurological:      General: No focal deficit present.      Mental Status: He is alert and oriented to person, place, and time.     Psychiatric:         Mood and Affect: Mood normal.         Assessment/Plan   Problem List Items Addressed This Visit       Obesity    Relevant Medications    tirzepatide, weight loss, (Zepbound) 2.5 mg/0.5 mL injection    Fatty liver - Primary    Relevant Orders    Comprehensive Metabolic Panel    MARYJANE (obstructive sleep  apnea)    Relevant Medications    tirzepatide, weight loss, (Zepbound) 2.5 mg/0.5 mL injection     Other Visit Diagnoses         Encounter for screening for malignant neoplasm of prostate        Relevant Orders    Prostate Specific Antigen      Encounter for colorectal cancer screening        Relevant Orders    Colonoscopy Screening; Average Risk Patient                 Thank you for coming in today, please call my office if you have any concerns or questions.     Dick LOPEZ, CNP

## 2025-08-06 RX ORDER — SEMAGLUTIDE 0.25 MG/.5ML
0.25 INJECTION, SOLUTION SUBCUTANEOUS WEEKLY
Qty: 6 ML | Refills: 0 | Status: SHIPPED | OUTPATIENT
Start: 2025-08-06 | End: 2025-11-04

## 2025-08-07 ENCOUNTER — LAB (OUTPATIENT)
Dept: LAB | Facility: HOSPITAL | Age: 50
End: 2025-08-07
Payer: COMMERCIAL

## 2025-08-07 ENCOUNTER — PRE-ADMISSION TESTING (OUTPATIENT)
Dept: PREADMISSION TESTING | Facility: HOSPITAL | Age: 50
End: 2025-08-07
Payer: COMMERCIAL

## 2025-08-07 ENCOUNTER — DOCUMENTATION (OUTPATIENT)
Dept: PREADMISSION TESTING | Facility: HOSPITAL | Age: 50
End: 2025-08-07

## 2025-08-07 VITALS
OXYGEN SATURATION: 97 % | TEMPERATURE: 98 F | RESPIRATION RATE: 18 BRPM | DIASTOLIC BLOOD PRESSURE: 85 MMHG | BODY MASS INDEX: 33.6 KG/M2 | HEIGHT: 73 IN | WEIGHT: 253.53 LBS | HEART RATE: 67 BPM | SYSTOLIC BLOOD PRESSURE: 150 MMHG

## 2025-08-07 DIAGNOSIS — R73.01 IMPAIRED FASTING GLUCOSE: ICD-10-CM

## 2025-08-07 DIAGNOSIS — I10 ESSENTIAL (PRIMARY) HYPERTENSION: Primary | ICD-10-CM

## 2025-08-07 DIAGNOSIS — R73.01 IFG (IMPAIRED FASTING GLUCOSE): ICD-10-CM

## 2025-08-07 DIAGNOSIS — Z01.818 ENCOUNTER FOR OTHER PREPROCEDURAL EXAMINATION: ICD-10-CM

## 2025-08-07 DIAGNOSIS — Z01.818 PREOP TESTING: ICD-10-CM

## 2025-08-07 DIAGNOSIS — I10 BENIGN ESSENTIAL HYPERTENSION: Primary | ICD-10-CM

## 2025-08-07 LAB
ANION GAP SERPL CALC-SCNC: 15 MMOL/L (ref 10–20)
BASOPHILS # BLD AUTO: 0.05 X10*3/UL (ref 0–0.1)
BASOPHILS NFR BLD AUTO: 0.7 %
BUN SERPL-MCNC: 19 MG/DL (ref 6–23)
CALCIUM SERPL-MCNC: 9 MG/DL (ref 8.6–10.3)
CHLORIDE SERPL-SCNC: 104 MMOL/L (ref 98–107)
CO2 SERPL-SCNC: 25 MMOL/L (ref 21–32)
CREAT SERPL-MCNC: 0.68 MG/DL (ref 0.5–1.3)
CRP SERPL-MCNC: 0.28 MG/DL
EGFRCR SERPLBLD CKD-EPI 2021: >90 ML/MIN/1.73M*2
EOSINOPHIL # BLD AUTO: 0.19 X10*3/UL (ref 0–0.7)
EOSINOPHIL NFR BLD AUTO: 2.8 %
ERYTHROCYTE [DISTWIDTH] IN BLOOD BY AUTOMATED COUNT: 13 % (ref 11.5–14.5)
ERYTHROCYTE [SEDIMENTATION RATE] IN BLOOD BY WESTERGREN METHOD: 4 MM/H (ref 0–15)
EST. AVERAGE GLUCOSE BLD GHB EST-MCNC: 91 MG/DL
GLUCOSE SERPL-MCNC: 106 MG/DL (ref 74–99)
HBA1C MFR BLD: 4.8 % (ref ?–5.7)
HCT VFR BLD AUTO: 43.6 % (ref 41–52)
HGB BLD-MCNC: 14.9 G/DL (ref 13.5–17.5)
IMM GRANULOCYTES # BLD AUTO: 0.03 X10*3/UL (ref 0–0.7)
IMM GRANULOCYTES NFR BLD AUTO: 0.4 % (ref 0–0.9)
LYMPHOCYTES # BLD AUTO: 2.43 X10*3/UL (ref 1.2–4.8)
LYMPHOCYTES NFR BLD AUTO: 35.7 %
MCH RBC QN AUTO: 28.2 PG (ref 26–34)
MCHC RBC AUTO-ENTMCNC: 34.2 G/DL (ref 32–36)
MCV RBC AUTO: 83 FL (ref 80–100)
MONOCYTES # BLD AUTO: 0.57 X10*3/UL (ref 0.1–1)
MONOCYTES NFR BLD AUTO: 8.4 %
NEUTROPHILS # BLD AUTO: 3.53 X10*3/UL (ref 1.2–7.7)
NEUTROPHILS NFR BLD AUTO: 52 %
NRBC BLD-RTO: 0 /100 WBCS (ref 0–0)
PLATELET # BLD AUTO: 227 X10*3/UL (ref 150–450)
POTASSIUM SERPL-SCNC: 3.9 MMOL/L (ref 3.5–5.3)
RBC # BLD AUTO: 5.28 X10*6/UL (ref 4.5–5.9)
SODIUM SERPL-SCNC: 140 MMOL/L (ref 136–145)
WBC # BLD AUTO: 6.8 X10*3/UL (ref 4.4–11.3)

## 2025-08-07 PROCEDURE — 80048 BASIC METABOLIC PNL TOTAL CA: CPT

## 2025-08-07 PROCEDURE — 99204 OFFICE O/P NEW MOD 45 MIN: CPT

## 2025-08-07 PROCEDURE — 83036 HEMOGLOBIN GLYCOSYLATED A1C: CPT

## 2025-08-07 PROCEDURE — 86140 C-REACTIVE PROTEIN: CPT

## 2025-08-07 PROCEDURE — 85025 COMPLETE CBC W/AUTO DIFF WBC: CPT

## 2025-08-07 PROCEDURE — 87081 CULTURE SCREEN ONLY: CPT | Mod: AHULAB

## 2025-08-07 PROCEDURE — 85652 RBC SED RATE AUTOMATED: CPT

## 2025-08-07 RX ORDER — CHLORHEXIDINE GLUCONATE ORAL RINSE 1.2 MG/ML
15 SOLUTION DENTAL AS NEEDED
Qty: 473 ML | Refills: 0 | Status: SHIPPED | OUTPATIENT
Start: 2025-08-07 | End: 2025-08-09

## 2025-08-07 ASSESSMENT — ENCOUNTER SYMPTOMS
CARDIOVASCULAR NEGATIVE: 1
GASTROINTESTINAL NEGATIVE: 1
NECK NEGATIVE: 1
ENDOCRINE NEGATIVE: 1
RESPIRATORY NEGATIVE: 1
CONSTITUTIONAL NEGATIVE: 1
ARTHRALGIAS: 1
NEUROLOGICAL NEGATIVE: 1

## 2025-08-07 NOTE — PREPROCEDURE INSTRUCTIONS
Medication List            Accurate as of August 7, 2025  9:07 AM. Always use your most recent med list.                amLODIPine 10 mg tablet  Commonly known as: Norvasc  TAKE 1 TABLET (10 MG) BY MOUTH ONCE DAILY. AS DIRECTED  Medication Adjustments for Surgery: Take/Use as prescribed     aspirin 81 mg EC tablet  Medication Adjustments for Surgery: Take/Use as prescribed     carvedilol 12.5 mg tablet  Commonly known as: Coreg  Take 1 tablet (12.5 mg) by mouth 2 times daily (morning and late afternoon).  Medication Adjustments for Surgery: Take/Use as prescribed     chlorhexidine 0.12 % solution  Commonly known as: Peridex  Use 15 mL in the mouth or throat if needed for wound care (swish and spit 15 mL for 30 seconds night prior to surgery and morning of surgery) for up to 2 days.  Medication Adjustments for Surgery: Take/Use as prescribed     DIETARY SUPPLEMENT ORAL  Additional Medication Adjustments for Surgery: Take last dose 7 days before surgery  Notes to patient: Last dose: 8/6/25     hydrOXYzine HCL 25 mg tablet  Commonly known as: Atarax  TAKE 1 TABLET (25 MG) BY MOUTH EVERY 6 HOURS IF NEEDED FOR ANXIETY.  Medication Adjustments for Surgery: Do Not take on the morning of surgery     lisinopriL-hydrochlorothiazide 20-25 mg tablet  TAKE 1 TABLET BY MOUTH EVERY DAY  Medication Adjustments for Surgery: Take last dose 1 day (24 hours) before surgery  Notes to patient: Do NOT take evening before surgery and do NOT take morning of surgery.     LORazepam 0.5 mg tablet  Commonly known as: Ativan  Take 1 tablet (0.5 mg) by mouth 3 times a day as needed for anxiety for up to 10 days.  Medication Adjustments for Surgery: Take/Use as prescribed     multivitamin tablet  Additional Medication Adjustments for Surgery: Take last dose 7 days before surgery  Notes to patient: Last dose: 8/6/25     rosuvastatin 5 mg tablet  Commonly known as: Crestor  TAKE 1 TABLET BY MOUTH EVERY DAY  Medication Adjustments for Surgery:  Take/Use as prescribed     triamcinolone 0.1 % cream  Commonly known as: Kenalog  Apply topically 2 times a day. Apply to affected area 1-2 times daily as needed.  Medication Adjustments for Surgery: Do Not take on the morning of surgery     Vitamin D3 25 mcg (1,000 units) tablet  Generic drug: cholecalciferol  Additional Medication Adjustments for Surgery: Take last dose 7 days before surgery  Notes to patient: Last dose: 8/6/25     Wegovy 0.25 mg/0.5 mL pen injector  Generic drug: semaglutide (weight loss)  Inject 0.25 mg under the skin 1 (one) time per week.  Medication Adjustments for Surgery: Do Not take on the morning of surgery  Notes to patient: If your dose is due on the day of surgery, do NOT take the dose. Please adhere  to 24 hour clear liquid diet prior to surgery.                   Preoperative Fasting Guidelines      Why must I stop eating and drinking before surgery?   With anesthesia, food or liquid in your stomach can enter your lungs causing serious complications   GLP-1 medications can slow the movement of food through your stomach and intestines.  This further increases the risk of food entering your lungs with anesthesia     When do I need to stop eating and drinking before my surgery?   To help ensure food has passed out of your stomach, START a clear liquid diet 24 hours before your surgery   On the day of your surgery/procedure, STOP all clear liquids 2 hours before your arrival time to the hospital/facility      A clear liquid diet consists of clear liquids and foods that melt into a clear liquid (i.e. gelatin) and excludes solid foods and liquids you cannot see through (i.e. milk). Clears can and should contain sugar to obtain a sufficient number of calories.  A clear liquid diet includes   Clear, fat-free broth   Clear nutritional drinks   Pulp-free popsicles, vegetable and fruit juice   Gelatin   Coffee and tea without creamer or milk   Clear soda and sports drinks      Diabetic Patients    Clear liquids should not be sugar-free    Check your blood glucose levels as you normally do   If you have symptoms of low blood glucose (shakiness, sweating, dizziness, confusion) or high blood glucose (dry mouth, excessive thirst, frequent urination, blurry vision), check your blood glucose level   For low blood glucose increase your consumption of sugar-containing clear liquid    For high blood glucose, decrease your consumption of sugar-containing clears and treat as you normally would   If symptoms persist seek medical attention      Examples of GLP-1 Medications   Trulicity   Ozempic   Mounjaro   Zepbound   Misty Hooks         CONTACT SURGEON'S OFFICE IF YOU DEVELOP:  * Fever = 100.4 F   * New respiratory symptoms (e.g. cough, shortness of breath, respiratory distress, sore throat)  * Recent loss of taste or smell  *Flu like symptoms such as headache, fatigue or gastrointestinal symptoms  * You develop any open sores, shingles, burning or painful urination   AND/OR:  * You no longer wish to have the surgery.  * Any other personal circumstances change that may lead to the need to cancel or defer this surgery.  *You were admitted to any hospital within one week of your planned procedure.    SMOKING:  *Quitting smoking can make a huge difference to your health and recovery from surgery.    *If you need help with quitting, call 4-183-QUIT-NOW.    SURGICAL TIME:  *You will be contacted between 2 p.m. and 6 p.m. the business day before your surgery with your arrival time.  *If you haven't received a call by 6pm, call [phone].  *Scheduled surgery times may change and you will be notified if this occurs-check your personal voicemail for any updates.    ON THE MORNING OF SURGERY:  *Wear comfortable, loose fitting clothing.   *Do not use moisturizers, creams, lotions or perfume.  *All jewelry and valuables should be left at home.  *Prosthetic devices such as contact  lenses, hearing aids, dentures, eyelash extensions, hairpins and body piercing must be removed before surgery.    BRING WITH YOU:  *Photo ID and insurance card  *Current list of medications and allergies  *Pacemaker/Defibrillator/Heart stent cards  *CPAP machine and mask  *Slings/splints/crutches  *Copy of your complete Advanced Directive/DHPOA-if applicable  *Neurostimulator implant remote    PARKING AND ARRIVAL:  *Check in at the Main Entrance desk and let them know you are here for surgery.  *You will be directed to the 2nd floor surgical waiting area.    IF YOU ARE HAVING OUTPATIENT/SAME DAY SURGERY:  *A responsible adult MUST accompany you at the time of discharge and stay with you for 24 hours after your surgery.  *You may NOT drive yourself home after surgery.  *You may use a taxi or ride sharing service ([employer], [employer]) to return home ONLY if you are accompanied by a friend or family member.  *Instructions for resuming your medications will be provided by your surgeon.      Home Preoperative Antibacterial Shower     What is a home preoperative antibacterial shower?  This shower is a way of cleaning the skin with a germ killing soap before surgery.  The soap contains chlorhexidine, commonly known as CHG.  CHG is a soap for your skin with germ killing ability.  Let your doctor know if you are allergic to chlorhexidine.    Why do I need to take a preoperative antibacterial shower?  Skin is not sterile.  It is best to try to make your skin as free of germs as possible before surgery.  Proper cleansing with a germ killing soap before surgery can lower the number of germs on your skin.  This helps to reduce the risk of infection at the surgical site.  Following the instructions listed below will help you prepare your skin for surgery.      How do I use the CHG skin cleanser?  Steps:  Begin using your CHG soap five days before your scheduled surgery on ________________________.    Days 1-4 Shower before bed:  Wash your  face and genitals with your normal soap and rinse.           2.    Apply the CHG soap to a clean wet washcloth.  Turn the water off or move away                From the water spray to avoid premature rinsing of the CHG soap as you are applying.     3.   Lather your entire body from the neck down.  Do not use on your face or genitals.  4. Pay special attention to the area(s) where your incision(s) will be located unless they are on your face.  Avoid scrubbing your skin too hard.  The important point is to have the CHG soap sit on your skin for 3 minutes.    When the 3 minutes are up, turn on the water and rinse the CHG soap off your body completely.   Pat yourself dry with a clean, freshly-laundered towel.  Dress in clean, freshly laundered night clothes.    Be sure to change bed sheets and blankets at least on the first night of CHG body wash use. May change linens every night of the above protocol for maximum benefit.   Day 5:  Last shower is the morning of surgery: Follow above Instructions.    NOTE:        *Keep CHG soap out of eyes and ear canals   *DO NOT wash with regular soap on your body after you have used the CHG soap solution  *DO NOT apply powders, lotions, or perfume.  *Deodorant may be used days 1-4, BUT NOT the day of surgery    Who should I contact if I have any questions regarding the use of CHG soap?  Call the [facility], Preadmission Testing at [phone] if you have any questions.              Patient Information: Pre-Operative Infection Prevention Measures     Why did I have my nose, under my arms and groin swabbed?  The purpose of the swab is to identify Staphylococcus aureus inside your nose or on your skin.  The swab was sent to the laboratory for culture.  A positive swab/culture for Staphylococcus aureus is called colonization or carriage.      What is Staphylococcus aureus?  Staphylococcus aureus, also known as “staph”, is a germ found on the skin or in the  nose of healthy people.  Sometimes Staphylococcus aureus can get into the body and cause an infection.  This can be minor (such as pimples, boils or other skin problems).  It might also be serious (such as blood infection, pneumonia or a surgical site infection).    What is Staphylococcus aureus colonization or carriage?  Colonization or carriage means that a person has the germ but is not sick from it.  These bacteria can be spread on the hands or when breathing or sneezing.    How is Staphylococcus aureus spread?  It is most often spread by close contact with a person or item that carries it.    What happens if my culture is positive for Staphylococcus aureus?  Your doctor/medical team will use this information to guide any antibiotic treatment which may be necessary.  Regardless of the culture results, we will clean the inside of your nose with a betadine swab just before you have your surgery.      Will I get an infection if I have Staphylococcus aureus in my nose or on my skin?  Anyone can get an infection with Staphylococcus aureus.  However, the best way to reduce your risk of infection is to follow the instructions provided to you for the use of your CHG soap and dental rinse.        Who should I contact if I have any questions?  Call the [facility], Preadmission Testing at [phone] if you have any questions.          Patient Information: Oral/Dental Rinse  **This is a prescription; pick it up at your preferred local pharmacy **  What is oral/dental rinse?   It is a mouthwash. It is a way of cleaning the mouth with a germ killing solution before your surgery.  The solution contains chlorhexidine, commonly known as CHG.   It is used inside the mouth to kill a bacteria known as Staphylococcus aureus.  Let your doctor know if you are allergic to Chlorhexidine.    Why do I need to use CHG oral/dental rinse?  The CHG oral/dental rinse helps to kill a bacteria in your mouth known  a Staphylococcus aureus.     This reduces the risk of infection at the surgical site.      Using your CHG oral/dental rinse  STEPS:  Use your CHG oral/dental rinse after you brush your teeth the night before (at bedtime) and the morning of your surgery.  Follow all directions on your prescription label.    Use the cap on the container to measure 15ml (fill cap to fill line)  Swish (gargle if you can) the mouthwash in your mouth for at least 30 seconds, (do not to swallow) spit out  After you use your CHG rinse, do not rinse your mouth with water, drink or eat.  Please refer to prescription label for the appropriate time to resume oral intake  Dental rinse comes in one size bottle: 473ml ~16oz.  You will have leftover    rinse, discard after this use.    What side effects might I have using the CHG oral/dental rinse?  CHG rinse will stick to plaque on the teeth.  Brush and floss just before use.  Teeth brushing will help avoid staining of plaque during use.    Who should I contact if I have questions about the CHG oral/dental rinse?  Please call [facility], Preadmission Testing at [phone] if you have any questions

## 2025-08-07 NOTE — CPM/PAT H&P
[facility]/PAT Evaluation       Name: Jeyson Wen (Jeyson Wen)  /Age: 1975/50 y.o.     Visit Type:   In-Person       Chief Complaint: left knee pain    HPI      Date of Consult: 25    Referring Provider:  Dr. Lc Jameson    Date, Surgery, and Length:  25, Left Knee Total Arthroplasty - Left, 200 minutes      Patient presents to [facility] for perioperative risk assessment prior to scheduled surgery. Pt with significant surgical history is regard to left knee, with worsening pain and ROM over the past few years which has negatively impacted his QOL and ability to do normal ADLs. X-rays revealed severe degenerative changes of the left knee with valgus limb alignment. Joint space narrowing with subchondral sclerosis and osteophyte formation was also appreciated.        This note was created in part upon personal review of patient's medical records.        Pt denies any past history of anesthetic complications such as PONV, awareness, prolonged sedation, dental damage, aspiration, cardiac arrest, difficult intubation, difficult I.V. access or unexpected hospital admissions. No history of malignant hyperthermia and or pseudocholinesterase deficiency.    No history of blood transfusions.    The patient IS NOT a Worship and will accept blood and blood products if medically indicated.     Type and screen WAS NOT sent.    Past Medical History:   Diagnosis Date    Agatston CAC score, <100 2019    LM 4.18, 2 LAD 68.22, 2 LCx 1.1, 1 RCA 22.45, 6   Total 95.95    Anxiety     Cardiology follow-up encounter     Rafael Evans MD  LOV 3/20/25    Fatty liver     Hyperlipidemia     Hypertension     IFG (impaired fasting glucose)     Obesity     Primary osteoarthritis of left knee     Plan: Left Knee Total Arthroplasty  25    Psoriasis     Sleep apnea     Vitamin D deficiency        Past Surgical History:   Procedure Laterality Date    ADENOIDECTOMY      HIP ARTHROPLASTY Bilateral     left  hip 2012 & right hip 2017    KNEE ARTHROSCOPY W/ DEBRIDEMENT Left 2012    ROTATOR CUFF REPAIR      TONSILLECTOMY      WISDOM TOOTH EXTRACTION       Family History   Problem Relation Name Age of Onset    Lung cancer Mother      Arthritis Mother      Heart disease Father          valve replacement    Other (aaa) Father      Prostate cancer Father      Hyperlipidemia Sister      Hyperlipidemia Brother       Social History     Tobacco Use   Smoking Status Never   Smokeless Tobacco Never     Social History     Substance and Sexual Activity   Alcohol Use Not Currently    Alcohol/week: 0.0 - 1.0 standard drinks of alcohol     Social History     Substance and Sexual Activity   Drug Use Never     Allergies   Allergen Reactions    Amoxicillin Hives and Itching    Poison Ivy Rash       Current Outpatient Medications   Medication Instructions    amLODIPine (NORVASC) 10 mg, oral, Daily, as directed    aspirin 81 mg, Daily    carvedilol (COREG) 12.5 mg, oral, 2 times daily (morning and late afternoon)    chlorhexidine (Peridex) 0.12 % solution 15 mL, Mouth/Throat, As needed    cholecalciferol (VITAMIN D3) 25 mcg, Daily    DIETARY SUPPLEMENT ORAL Daily    hydrOXYzine HCL (ATARAX) 25 mg, oral, Every 6 hours PRN    lisinopriL-hydrochlorothiazide 20-25 mg tablet 1 tablet, oral, Daily    LORazepam (ATIVAN) 0.5 mg, oral, 3 times daily PRN    multivitamin tablet 1 tablet, Daily    rosuvastatin (CRESTOR) 5 mg, oral, Daily    triamcinolone (Kenalog) 0.1 % cream Topical, 2 times daily, Apply to affected area 1-2 times daily as needed.    Wegovy 0.25 mg, subcutaneous, Weekly       PAT ROS:   Constitutional:   neg    Neuro/Psych:   neg    Eyes:    use of corrective lenses (wears glasses)  Ears:   neg    Nose:   neg    Mouth:   neg    Throat:   neg    Neck:   neg    Cardio:   neg    Respiratory:   neg    Endocrine:   neg    GI:   neg    :   neg    Musculoskeletal:    arthralgias (left knee pain)  Hematologic:   neg    Skin:  neg   "      Physical Exam  Vitals reviewed.   Constitutional:       Appearance: Normal appearance. He is obese.     Cardiovascular:      Rate and Rhythm: Normal rate and regular rhythm.      Heart sounds: No murmur heard.     No friction rub. No gallop.   Pulmonary:      Effort: Pulmonary effort is normal.      Breath sounds: No wheezing, rhonchi or rales.     Musculoskeletal:      Cervical back: Normal range of motion.      Comments: Limited ROM left knee     Neurological:      Mental Status: He is alert.          PAT AIRWAY:   Airway:     Mallampati::  I    Neck ROM::  Full  normal          Visit Vitals  /85   Pulse 67   Temp 36.7 °C (98 °F) (Temporal)   Resp 18   Ht 1.842 m (6' 0.5\")   Wt 115 kg (253 lb 8.5 oz)   SpO2 97%   BMI 33.91 kg/m²   Smoking Status Never   BSA 2.43 m²         Patient is a 50 y.o.  male scheduled for Left Knee Total Arthroplasty - Left with Dr. Jameson on 8/14/25.      Plan      Neuro:  No neurologic diagnosis or significant findings on chart review, clinical presentation and evaluation.  No grossly apparent neurologic perioperative risk.  Patient is at increased risk for perioperative CVA secondary to  HTN, operative time > 2.5 hours        Cardiovascular:  HTN- controlled on Amlodipine, Coreg (cont both through periop period) and lisinopril-hydrochlorothiazide (24 hr hold instructions advised). Follows with Cardiology, Dr. Evans, LOV 3/20/25, advised to f/u in 1 yr.    HLD- controlled on Rosuvastatin (cont through periop period). ASA 81 mg for prevention (cont through periop period).    RCRI: 0 Risk of Mace: 0.5%    Caprini: 7    Patient denies any chest pain, tightness, heaviness, pressure, radiating pain, palpitations, irregular heartbeats, lightheadedness, cough, congestion, shortness of breath, PAT, PND, near syncope, weight loss or gain.    Good functional capacity(>4 METS)      EKG in PAT not indicated, recent obtained 3/20/25 and copied below:    Encounter Date: " 03/20/25   ECG 12 lead (Clinic Performed)   Result Value    Ventricular Rate 85    Atrial Rate 85    NH Interval 144    QRS Duration 90    QT Interval 368    QTC Calculation(Bazett) 437    P Axis 9    R Axis 29    T Axis 10    QRS Count 14    Q Onset 215    P Onset 143    P Offset 193    T Offset 399    QTC Fredericia 413    Narrative    Normal sinus rhythm  Inferior infarct , age undetermined  Abnormal ECG  When compared with ECG of 03-MAR-2024 23:19,  Inferior infarct is now Present               Pulmonary:  No pulmonary diagnosis, however patient is at increased risk of perioperative complications secondary to  obesity, duration of surgery > 2 hours, types of anesthetic  Stop Bang- diagnosed MARYJANE, does not use CPAP.  ARISCAT: <26 points, 1.6% risk of in-hospital postoperative pulmonary complication  PRODIGY: High risk for opioid induced respiratory depression  Pumonary toilet education discussed, patient also provided deep breathing exercises and incentive spirometry educational handout          Renal/endo:  Obesity- on Wegovy (takes dose on Sundays, 24 hr clear liquid diet advised, do not take DOS).            Heme:  Patient instructed to ambulate as soon as possible postoperatively to decrease thromboembolic risk.    Initiate mechanical DVT prophylaxis as soon as possible and initiate chemical prophylaxis when deemed safe from a bleeding standpoint post surgery.              Risk assessment complete.  This patient is LOW/INTERMEDIATE risk candidate undergoing MODERATE risk procedure, patient is medically optimized for surgery.        Labs/testing obtained in PAT on 8/7/25: CBC, BMP, A1C, ESR, CRP, MRSA    Lab Results   Component Value Date    WBC 6.8 08/07/2025    HGB 14.9 08/07/2025    HCT 43.6 08/07/2025    MCV 83 08/07/2025     08/07/2025     Lab Results   Component Value Date    GLUCOSE 106 (H) 08/07/2025    CALCIUM 9.0 08/07/2025     08/07/2025    K 3.9 08/07/2025    CO2 25 08/07/2025      08/07/2025    BUN 19 08/07/2025    CREATININE 0.68 08/07/2025     Lab Results   Component Value Date    CRP 0.28 08/07/2025     Lab Results   Component Value Date    SEDRATE 4 08/07/2025     Lab Results   Component Value Date    HGBA1C 4.8 08/07/2025         Follow up/communication: None      Preoperative medication instructions were provided and reviewed with the patient.  Any additional testing or evaluation was explained to the patient.  Nothing by mouth instructions were discussed and patient's questions were answered prior to conclusion to this encounter.  Patient verbalized understanding of preoperative instructions given in preadmission testing; discharge instructions available in EMR.    This note was dictated with speech recognition.  Minor errors may have been detected during use of speech recognition.

## 2025-08-09 LAB — STAPHYLOCOCCUS SPEC CULT: ABNORMAL

## 2025-08-13 ENCOUNTER — ANESTHESIA EVENT (OUTPATIENT)
Dept: OPERATING ROOM | Facility: HOSPITAL | Age: 50
End: 2025-08-13
Payer: COMMERCIAL

## 2025-08-13 RX ORDER — DIPHENHYDRAMINE HYDROCHLORIDE 50 MG/ML
12.5 INJECTION, SOLUTION INTRAMUSCULAR; INTRAVENOUS ONCE AS NEEDED
Status: CANCELLED | OUTPATIENT
Start: 2025-08-13

## 2025-08-13 RX ORDER — SODIUM CHLORIDE, SODIUM LACTATE, POTASSIUM CHLORIDE, CALCIUM CHLORIDE 600; 310; 30; 20 MG/100ML; MG/100ML; MG/100ML; MG/100ML
100 INJECTION, SOLUTION INTRAVENOUS CONTINUOUS
Status: CANCELLED | OUTPATIENT
Start: 2025-08-13 | End: 2025-08-14

## 2025-08-13 RX ORDER — LABETALOL HYDROCHLORIDE 5 MG/ML
5 INJECTION, SOLUTION INTRAVENOUS ONCE AS NEEDED
Status: CANCELLED | OUTPATIENT
Start: 2025-08-13

## 2025-08-13 RX ORDER — OXYCODONE HYDROCHLORIDE 5 MG/1
5 TABLET ORAL EVERY 4 HOURS PRN
Refills: 0 | Status: CANCELLED | OUTPATIENT
Start: 2025-08-13

## 2025-08-13 RX ORDER — ONDANSETRON HYDROCHLORIDE 2 MG/ML
4 INJECTION, SOLUTION INTRAVENOUS ONCE AS NEEDED
Status: CANCELLED | OUTPATIENT
Start: 2025-08-13

## 2025-08-13 RX ORDER — LIDOCAINE HYDROCHLORIDE 10 MG/ML
0.1 INJECTION, SOLUTION EPIDURAL; INFILTRATION; INTRACAUDAL; PERINEURAL ONCE
Status: CANCELLED | OUTPATIENT
Start: 2025-08-13 | End: 2025-08-13

## 2025-08-14 ENCOUNTER — ANESTHESIA (OUTPATIENT)
Dept: OPERATING ROOM | Facility: HOSPITAL | Age: 50
End: 2025-08-14
Payer: COMMERCIAL

## 2025-08-14 ENCOUNTER — HOSPITAL ENCOUNTER (OUTPATIENT)
Facility: HOSPITAL | Age: 50
Setting detail: OUTPATIENT SURGERY
Discharge: HOME | End: 2025-08-14
Attending: STUDENT IN AN ORGANIZED HEALTH CARE EDUCATION/TRAINING PROGRAM | Admitting: STUDENT IN AN ORGANIZED HEALTH CARE EDUCATION/TRAINING PROGRAM
Payer: COMMERCIAL

## 2025-08-14 VITALS
RESPIRATION RATE: 16 BRPM | HEART RATE: 75 BPM | WEIGHT: 253.53 LBS | OXYGEN SATURATION: 95 % | TEMPERATURE: 96.8 F | BODY MASS INDEX: 33.6 KG/M2 | SYSTOLIC BLOOD PRESSURE: 142 MMHG | DIASTOLIC BLOOD PRESSURE: 96 MMHG | HEIGHT: 73 IN

## 2025-08-14 DIAGNOSIS — Z96.652 S/P TOTAL KNEE ARTHROPLASTY, LEFT: Primary | ICD-10-CM

## 2025-08-14 PROCEDURE — 2500000005 HC RX 250 GENERAL PHARMACY W/O HCPCS

## 2025-08-14 PROCEDURE — 1210000001 HC SEMI-PRIVATE ROOM DAILY

## 2025-08-14 PROCEDURE — 2500000001 HC RX 250 WO HCPCS SELF ADMINISTERED DRUGS (ALT 637 FOR MEDICARE OP)

## 2025-08-14 RX ORDER — ACETAMINOPHEN 500 MG
1000 TABLET ORAL EVERY 8 HOURS
Qty: 60 TABLET | Refills: 1 | Status: SHIPPED | OUTPATIENT
Start: 2025-08-14 | End: 2025-09-03

## 2025-08-14 RX ORDER — TRAMADOL HYDROCHLORIDE 50 MG/1
50-100 TABLET, FILM COATED ORAL EVERY 6 HOURS PRN
Qty: 40 TABLET | Refills: 0 | Status: SHIPPED | OUTPATIENT
Start: 2025-08-14 | End: 2025-08-21

## 2025-08-14 RX ORDER — ACETAMINOPHEN 325 MG/1
650 TABLET ORAL ONCE
Status: COMPLETED | OUTPATIENT
Start: 2025-08-14 | End: 2025-08-14

## 2025-08-14 RX ORDER — DOXYCYCLINE 100 MG/1
100 TABLET ORAL 2 TIMES DAILY
Qty: 10 TABLET | Refills: 0 | Status: SHIPPED | OUTPATIENT
Start: 2025-08-14 | End: 2025-08-19

## 2025-08-14 RX ORDER — NAPROXEN SODIUM 220 MG/1
81 TABLET, FILM COATED ORAL 2 TIMES DAILY
Qty: 60 TABLET | Refills: 0 | Status: SHIPPED | OUTPATIENT
Start: 2025-08-14 | End: 2025-09-13

## 2025-08-14 RX ORDER — DOCUSATE SODIUM 100 MG/1
100 CAPSULE, LIQUID FILLED ORAL 2 TIMES DAILY
Qty: 30 CAPSULE | Refills: 0 | Status: SHIPPED | OUTPATIENT
Start: 2025-08-14 | End: 2025-08-29

## 2025-08-14 RX ORDER — ONDANSETRON 4 MG/1
4 TABLET, FILM COATED ORAL EVERY 8 HOURS PRN
Qty: 20 TABLET | Refills: 0 | Status: SHIPPED | OUTPATIENT
Start: 2025-08-14

## 2025-08-14 RX ORDER — SENNOSIDES 8.6 MG/1
1 TABLET ORAL DAILY
Qty: 15 TABLET | Refills: 0 | Status: SHIPPED | OUTPATIENT
Start: 2025-08-14 | End: 2025-08-29

## 2025-08-14 RX ORDER — PANTOPRAZOLE SODIUM 40 MG/1
40 TABLET, DELAYED RELEASE ORAL
Qty: 30 TABLET | Refills: 0 | Status: SHIPPED | OUTPATIENT
Start: 2025-08-14 | End: 2025-09-13

## 2025-08-14 RX ORDER — PROPOFOL 10 MG/ML
INJECTION, EMULSION INTRAVENOUS CONTINUOUS PRN
Status: DISCONTINUED | OUTPATIENT
Start: 2025-08-14 | End: 2025-08-14 | Stop reason: HOSPADM

## 2025-08-14 RX ORDER — OXYCODONE HYDROCHLORIDE 5 MG/1
5-10 TABLET ORAL EVERY 6 HOURS PRN
Qty: 40 TABLET | Refills: 0 | Status: SHIPPED | OUTPATIENT
Start: 2025-08-14 | End: 2025-08-21

## 2025-08-14 RX ORDER — CELECOXIB 200 MG/1
200 CAPSULE ORAL ONCE
Status: COMPLETED | OUTPATIENT
Start: 2025-08-14 | End: 2025-08-14

## 2025-08-14 RX ADMIN — CELECOXIB 200 MG: 200 CAPSULE ORAL at 10:21

## 2025-08-14 RX ADMIN — POVIDONE-IODINE 1 APPLICATION: 5 SOLUTION TOPICAL at 10:21

## 2025-08-14 RX ADMIN — ACETAMINOPHEN 650 MG: 325 TABLET ORAL at 10:21

## 2025-08-14 ASSESSMENT — PAIN - FUNCTIONAL ASSESSMENT: PAIN_FUNCTIONAL_ASSESSMENT: 0-10

## 2025-08-14 ASSESSMENT — PAIN SCALES - GENERAL: PAINLEVEL_OUTOF10: 0 - NO PAIN

## 2025-08-24 DIAGNOSIS — I15.9 SECONDARY HYPERTENSION: ICD-10-CM

## 2025-08-25 RX ORDER — AMLODIPINE BESYLATE 10 MG/1
10 TABLET ORAL DAILY
Qty: 90 TABLET | Refills: 1 | Status: SHIPPED | OUTPATIENT
Start: 2025-08-25 | End: 2026-02-21

## 2025-08-26 ENCOUNTER — APPOINTMENT (OUTPATIENT)
Dept: ORTHOPEDIC SURGERY | Facility: CLINIC | Age: 50
End: 2025-08-26
Payer: COMMERCIAL

## 2025-08-27 ENCOUNTER — ANESTHESIA EVENT (OUTPATIENT)
Dept: OPERATING ROOM | Facility: HOSPITAL | Age: 50
End: 2025-08-27
Payer: COMMERCIAL

## 2025-08-28 ENCOUNTER — APPOINTMENT (OUTPATIENT)
Dept: RADIOLOGY | Facility: HOSPITAL | Age: 50
End: 2025-08-28
Payer: COMMERCIAL

## 2025-08-28 ENCOUNTER — PHARMACY VISIT (OUTPATIENT)
Dept: PHARMACY | Facility: CLINIC | Age: 50
End: 2025-08-28
Payer: MEDICARE

## 2025-08-28 ENCOUNTER — HOSPITAL ENCOUNTER (OUTPATIENT)
Facility: HOSPITAL | Age: 50
Discharge: HOME | End: 2025-08-29
Attending: STUDENT IN AN ORGANIZED HEALTH CARE EDUCATION/TRAINING PROGRAM | Admitting: STUDENT IN AN ORGANIZED HEALTH CARE EDUCATION/TRAINING PROGRAM
Payer: COMMERCIAL

## 2025-08-28 ENCOUNTER — ANESTHESIA (OUTPATIENT)
Dept: OPERATING ROOM | Facility: HOSPITAL | Age: 50
End: 2025-08-28
Payer: COMMERCIAL

## 2025-08-28 PROBLEM — M17.12 ARTHRITIS OF LEFT KNEE: Status: ACTIVE | Noted: 2025-08-28

## 2025-08-28 PROCEDURE — 2500000005 HC RX 250 GENERAL PHARMACY W/O HCPCS: Performed by: NURSE ANESTHETIST, CERTIFIED REGISTERED

## 2025-08-28 PROCEDURE — 2500000004 HC RX 250 GENERAL PHARMACY W/ HCPCS (ALT 636 FOR OP/ED): Performed by: NURSE ANESTHETIST, CERTIFIED REGISTERED

## 2025-08-28 PROCEDURE — 2500000004 HC RX 250 GENERAL PHARMACY W/ HCPCS (ALT 636 FOR OP/ED): Performed by: ANESTHESIOLOGY

## 2025-08-28 PROCEDURE — RXMED WILLOW AMBULATORY MEDICATION CHARGE

## 2025-08-28 PROCEDURE — 64447 NJX AA&/STRD FEMORAL NRV IMG: CPT | Performed by: ANESTHESIOLOGY

## 2025-08-28 PROCEDURE — 2500000004 HC RX 250 GENERAL PHARMACY W/ HCPCS (ALT 636 FOR OP/ED)

## 2025-08-28 RX ORDER — TRANEXAMIC ACID 1 G/10ML
INJECTION, SOLUTION INTRAVENOUS AS NEEDED
Status: DISCONTINUED | OUTPATIENT
Start: 2025-08-28 | End: 2025-08-28

## 2025-08-28 RX ORDER — ONDANSETRON HYDROCHLORIDE 2 MG/ML
INJECTION, SOLUTION INTRAVENOUS AS NEEDED
Status: DISCONTINUED | OUTPATIENT
Start: 2025-08-28 | End: 2025-08-28

## 2025-08-28 RX ORDER — MIDAZOLAM HYDROCHLORIDE 1 MG/ML
INJECTION, SOLUTION INTRAMUSCULAR; INTRAVENOUS AS NEEDED
Status: DISCONTINUED | OUTPATIENT
Start: 2025-08-28 | End: 2025-08-28

## 2025-08-28 RX ORDER — SODIUM CHLORIDE, SODIUM LACTATE, POTASSIUM CHLORIDE, CALCIUM CHLORIDE 600; 310; 30; 20 MG/100ML; MG/100ML; MG/100ML; MG/100ML
INJECTION, SOLUTION INTRAVENOUS CONTINUOUS PRN
Status: DISCONTINUED | OUTPATIENT
Start: 2025-08-28 | End: 2025-08-28

## 2025-08-28 RX ORDER — KETOROLAC TROMETHAMINE 30 MG/ML
INJECTION, SOLUTION INTRAMUSCULAR; INTRAVENOUS AS NEEDED
Status: DISCONTINUED | OUTPATIENT
Start: 2025-08-28 | End: 2025-08-28

## 2025-08-28 RX ORDER — PROPOFOL 10 MG/ML
INJECTION, EMULSION INTRAVENOUS AS NEEDED
Status: DISCONTINUED | OUTPATIENT
Start: 2025-08-28 | End: 2025-08-28

## 2025-08-28 RX ORDER — ROPIVACAINE HYDROCHLORIDE 5 MG/ML
INJECTION, SOLUTION EPIDURAL; INFILTRATION; PERINEURAL
Status: COMPLETED | OUTPATIENT
Start: 2025-08-28 | End: 2025-08-28

## 2025-08-28 RX ORDER — ROCURONIUM BROMIDE 10 MG/ML
INJECTION, SOLUTION INTRAVENOUS AS NEEDED
Status: DISCONTINUED | OUTPATIENT
Start: 2025-08-28 | End: 2025-08-28

## 2025-08-28 RX ORDER — FENTANYL CITRATE 50 UG/ML
INJECTION, SOLUTION INTRAMUSCULAR; INTRAVENOUS AS NEEDED
Status: DISCONTINUED | OUTPATIENT
Start: 2025-08-28 | End: 2025-08-28

## 2025-08-28 RX ADMIN — TRANEXAMIC ACID 1000 MG: 100 INJECTION, SOLUTION INTRAVENOUS at 15:26

## 2025-08-28 RX ADMIN — ROCURONIUM BROMIDE 10 MG: 10 INJECTION, SOLUTION INTRAVENOUS at 18:33

## 2025-08-28 RX ADMIN — HYDROMORPHONE HYDROCHLORIDE 0.5 MG: 2 INJECTION INTRAMUSCULAR; INTRAVENOUS; SUBCUTANEOUS at 20:13

## 2025-08-28 RX ADMIN — ROCURONIUM BROMIDE 10 MG: 10 INJECTION, SOLUTION INTRAVENOUS at 17:43

## 2025-08-28 RX ADMIN — CEFAZOLIN SODIUM 2 G: 2 INJECTION, SOLUTION INTRAVENOUS at 19:07

## 2025-08-28 RX ADMIN — PROPOFOL 30 MG: 10 INJECTION, EMULSION INTRAVENOUS at 18:52

## 2025-08-28 RX ADMIN — ROCURONIUM BROMIDE 20 MG: 10 INJECTION, SOLUTION INTRAVENOUS at 19:20

## 2025-08-28 RX ADMIN — ROPIVACAINE HYDROCHLORIDE 20 ML: 5 INJECTION, SOLUTION EPIDURAL; INFILTRATION; PERINEURAL at 12:15

## 2025-08-28 RX ADMIN — PROPOFOL 30 MG: 10 INJECTION, EMULSION INTRAVENOUS at 17:13

## 2025-08-28 RX ADMIN — PROPOFOL 40 MG: 10 INJECTION, EMULSION INTRAVENOUS at 15:40

## 2025-08-28 RX ADMIN — FENTANYL CITRATE 50 MCG: 50 INJECTION, SOLUTION INTRAMUSCULAR; INTRAVENOUS at 15:56

## 2025-08-28 RX ADMIN — ROCURONIUM BROMIDE 20 MG: 10 INJECTION, SOLUTION INTRAVENOUS at 16:33

## 2025-08-28 RX ADMIN — CEFAZOLIN SODIUM 2 G: 2 INJECTION, SOLUTION INTRAVENOUS at 15:03

## 2025-08-28 RX ADMIN — PROPOFOL 250 MG: 10 INJECTION, EMULSION INTRAVENOUS at 15:23

## 2025-08-28 RX ADMIN — PROPOFOL 20 MG: 10 INJECTION, EMULSION INTRAVENOUS at 18:01

## 2025-08-28 RX ADMIN — PROPOFOL 20 MG: 10 INJECTION, EMULSION INTRAVENOUS at 17:43

## 2025-08-28 RX ADMIN — MEPIVACAINE HYDROCHLORIDE 3 ML: 15 INJECTION, SOLUTION EPIDURAL; INFILTRATION at 15:13

## 2025-08-28 RX ADMIN — KETOROLAC TROMETHAMINE 30 MG: 30 INJECTION, SOLUTION INTRAMUSCULAR at 20:52

## 2025-08-28 RX ADMIN — ONDANSETRON 4 MG: 2 INJECTION, SOLUTION INTRAMUSCULAR; INTRAVENOUS at 20:51

## 2025-08-28 RX ADMIN — ROCURONIUM BROMIDE 10 MG: 10 INJECTION, SOLUTION INTRAVENOUS at 17:04

## 2025-08-28 RX ADMIN — ROCURONIUM BROMIDE 10 MG: 10 INJECTION, SOLUTION INTRAVENOUS at 18:15

## 2025-08-28 RX ADMIN — PROPOFOL 50 MG: 10 INJECTION, EMULSION INTRAVENOUS at 15:28

## 2025-08-28 RX ADMIN — HYDROMORPHONE HYDROCHLORIDE 0.5 MG: 2 INJECTION INTRAMUSCULAR; INTRAVENOUS; SUBCUTANEOUS at 17:13

## 2025-08-28 RX ADMIN — PROPOFOL 40 MG: 10 INJECTION, EMULSION INTRAVENOUS at 18:16

## 2025-08-28 RX ADMIN — DEXAMETHASONE SODIUM PHOSPHATE 10 MG: 4 INJECTION, SOLUTION INTRAMUSCULAR; INTRAVENOUS at 12:15

## 2025-08-28 RX ADMIN — PROPOFOL 20 MG: 10 INJECTION, EMULSION INTRAVENOUS at 18:34

## 2025-08-28 RX ADMIN — SUGAMMADEX 200 MG: 100 INJECTION, SOLUTION INTRAVENOUS at 21:10

## 2025-08-28 RX ADMIN — PROPOFOL 10 MG: 10 INJECTION, EMULSION INTRAVENOUS at 21:10

## 2025-08-28 RX ADMIN — ROCURONIUM BROMIDE 10 MG: 10 INJECTION, SOLUTION INTRAVENOUS at 16:03

## 2025-08-28 RX ADMIN — PROPOFOL 30 MG: 10 INJECTION, EMULSION INTRAVENOUS at 20:34

## 2025-08-28 RX ADMIN — PROPOFOL 30 MG: 10 INJECTION, EMULSION INTRAVENOUS at 17:22

## 2025-08-28 RX ADMIN — FENTANYL CITRATE 50 MCG: 50 INJECTION, SOLUTION INTRAMUSCULAR; INTRAVENOUS at 15:23

## 2025-08-28 RX ADMIN — PROPOFOL 20 MG: 10 INJECTION, EMULSION INTRAVENOUS at 19:07

## 2025-08-28 RX ADMIN — PROPOFOL 30 MG: 10 INJECTION, EMULSION INTRAVENOUS at 20:45

## 2025-08-28 RX ADMIN — MIDAZOLAM 2 MG: 1 INJECTION INTRAMUSCULAR; INTRAVENOUS at 15:21

## 2025-08-28 RX ADMIN — HYDROMORPHONE HYDROCHLORIDE 0.5 MG: 2 INJECTION INTRAMUSCULAR; INTRAVENOUS; SUBCUTANEOUS at 18:34

## 2025-08-28 RX ADMIN — ROCURONIUM BROMIDE 50 MG: 10 INJECTION, SOLUTION INTRAVENOUS at 15:23

## 2025-08-28 RX ADMIN — SODIUM CHLORIDE, POTASSIUM CHLORIDE, SODIUM LACTATE AND CALCIUM CHLORIDE: 600; 310; 30; 20 INJECTION, SOLUTION INTRAVENOUS at 14:56

## 2025-08-28 SDOH — ECONOMIC STABILITY: INCOME INSECURITY: IN THE PAST 12 MONTHS HAS THE ELECTRIC, GAS, OIL, OR WATER COMPANY THREATENED TO SHUT OFF SERVICES IN YOUR HOME?: NO

## 2025-08-28 SDOH — ECONOMIC STABILITY: FOOD INSECURITY: WITHIN THE PAST 12 MONTHS, THE FOOD YOU BOUGHT JUST DIDN'T LAST AND YOU DIDN'T HAVE MONEY TO GET MORE.: NEVER TRUE

## 2025-08-28 SDOH — SOCIAL STABILITY: SOCIAL INSECURITY: ARE YOU OR HAVE YOU BEEN THREATENED OR ABUSED PHYSICALLY, EMOTIONALLY, OR SEXUALLY BY ANYONE?: NO

## 2025-08-28 SDOH — SOCIAL STABILITY: SOCIAL INSECURITY: ARE THERE ANY APPARENT SIGNS OF INJURIES/BEHAVIORS THAT COULD BE RELATED TO ABUSE/NEGLECT?: NO

## 2025-08-28 SDOH — SOCIAL STABILITY: SOCIAL INSECURITY
WITHIN THE LAST YEAR, HAVE YOU BEEN KICKED, HIT, SLAPPED, OR OTHERWISE PHYSICALLY HURT BY YOUR PARTNER OR EX-PARTNER?: NO

## 2025-08-28 SDOH — SOCIAL STABILITY: SOCIAL INSECURITY: ABUSE: ADULT

## 2025-08-28 SDOH — SOCIAL STABILITY: SOCIAL INSECURITY
WITHIN THE LAST YEAR, HAVE YOU BEEN RAPED OR FORCED TO HAVE ANY KIND OF SEXUAL ACTIVITY BY YOUR PARTNER OR EX-PARTNER?: NO

## 2025-08-28 SDOH — SOCIAL STABILITY: SOCIAL INSECURITY: WITHIN THE LAST YEAR, HAVE YOU BEEN HUMILIATED OR EMOTIONALLY ABUSED IN OTHER WAYS BY YOUR PARTNER OR EX-PARTNER?: NO

## 2025-08-28 SDOH — SOCIAL STABILITY: SOCIAL INSECURITY: WITHIN THE LAST YEAR, HAVE YOU BEEN AFRAID OF YOUR PARTNER OR EX-PARTNER?: NO

## 2025-08-28 SDOH — HEALTH STABILITY: MENTAL HEALTH: CURRENT SMOKER: 0

## 2025-08-28 SDOH — ECONOMIC STABILITY: FOOD INSECURITY: WITHIN THE PAST 12 MONTHS, YOU WORRIED THAT YOUR FOOD WOULD RUN OUT BEFORE YOU GOT THE MONEY TO BUY MORE.: NEVER TRUE

## 2025-08-28 SDOH — SOCIAL STABILITY: SOCIAL INSECURITY: HAVE YOU HAD THOUGHTS OF HARMING ANYONE ELSE?: NO

## 2025-08-28 SDOH — SOCIAL STABILITY: SOCIAL INSECURITY: DOES ANYONE TRY TO KEEP YOU FROM HAVING/CONTACTING OTHER FRIENDS OR DOING THINGS OUTSIDE YOUR HOME?: NO

## 2025-08-28 SDOH — SOCIAL STABILITY: SOCIAL INSECURITY: DO YOU FEEL UNSAFE GOING BACK TO THE PLACE WHERE YOU ARE LIVING?: NO

## 2025-08-28 SDOH — SOCIAL STABILITY: SOCIAL INSECURITY: DO YOU FEEL ANYONE HAS EXPLOITED OR TAKEN ADVANTAGE OF YOU FINANCIALLY OR OF YOUR PERSONAL PROPERTY?: NO

## 2025-08-28 SDOH — SOCIAL STABILITY: SOCIAL INSECURITY: HAVE YOU HAD ANY THOUGHTS OF HARMING ANYONE ELSE?: NO

## 2025-08-28 SDOH — SOCIAL STABILITY: SOCIAL INSECURITY: HAS ANYONE EVER THREATENED TO HURT YOUR FAMILY OR YOUR PETS?: NO

## 2025-08-28 ASSESSMENT — COGNITIVE AND FUNCTIONAL STATUS - GENERAL
WALKING IN HOSPITAL ROOM: A LITTLE
HELP NEEDED FOR BATHING: A LITTLE
MOVING TO AND FROM BED TO CHAIR: A LITTLE
MOBILITY SCORE: 20
TOILETING: A LITTLE
PATIENT BASELINE BEDBOUND: NO
DRESSING REGULAR LOWER BODY CLOTHING: A LITTLE
DAILY ACTIVITIY SCORE: 21
CLIMB 3 TO 5 STEPS WITH RAILING: A LITTLE
STANDING UP FROM CHAIR USING ARMS: A LITTLE

## 2025-08-28 ASSESSMENT — LIFESTYLE VARIABLES
HOW MANY STANDARD DRINKS CONTAINING ALCOHOL DO YOU HAVE ON A TYPICAL DAY: 1 OR 2
AUDIT-C TOTAL SCORE: 2
HOW OFTEN DO YOU HAVE 6 OR MORE DRINKS ON ONE OCCASION: LESS THAN MONTHLY
HOW OFTEN DO YOU HAVE A DRINK CONTAINING ALCOHOL: MONTHLY OR LESS
AUDIT-C TOTAL SCORE: 2
SKIP TO QUESTIONS 9-10: 0

## 2025-08-28 ASSESSMENT — ACTIVITIES OF DAILY LIVING (ADL)
HEARING - RIGHT EAR: FUNCTIONAL
DRESSING YOURSELF: INDEPENDENT
JUDGMENT_ADEQUATE_SAFELY_COMPLETE_DAILY_ACTIVITIES: YES
BATHING: INDEPENDENT
ADEQUATE_TO_COMPLETE_ADL: YES
WALKS IN HOME: INDEPENDENT
LACK_OF_TRANSPORTATION: NO
PATIENT'S MEMORY ADEQUATE TO SAFELY COMPLETE DAILY ACTIVITIES?: YES
TOILETING: INDEPENDENT
GROOMING: INDEPENDENT
HEARING - LEFT EAR: FUNCTIONAL
FEEDING YOURSELF: INDEPENDENT

## 2025-08-28 ASSESSMENT — PAIN SCALES - GENERAL
PAINLEVEL_OUTOF10: 4
PAINLEVEL_OUTOF10: 7
PAIN_LEVEL: 0
PAINLEVEL_OUTOF10: 5 - MODERATE PAIN
PAINLEVEL_OUTOF10: 0 - NO PAIN
PAINLEVEL_OUTOF10: 7
PAINLEVEL_OUTOF10: 7
PAINLEVEL_OUTOF10: 0 - NO PAIN
PAINLEVEL_OUTOF10: 5 - MODERATE PAIN
PAINLEVEL_OUTOF10: 3
PAINLEVEL_OUTOF10: 4
PAINLEVEL_OUTOF10: 0 - NO PAIN
PAINLEVEL_OUTOF10: 0 - NO PAIN
PAINLEVEL_OUTOF10: 4
PAINLEVEL_OUTOF10: 4
PAINLEVEL_OUTOF10: 0 - NO PAIN
PAINLEVEL_OUTOF10: 5 - MODERATE PAIN
PAINLEVEL_OUTOF10: 0 - NO PAIN
PAINLEVEL_OUTOF10: 0 - NO PAIN
PAINLEVEL_OUTOF10: 3

## 2025-08-28 ASSESSMENT — PAIN - FUNCTIONAL ASSESSMENT
PAIN_FUNCTIONAL_ASSESSMENT: 0-10
PAIN_FUNCTIONAL_ASSESSMENT: WONG-BAKER FACES
PAIN_FUNCTIONAL_ASSESSMENT: 0-10
PAIN_FUNCTIONAL_ASSESSMENT: WONG-BAKER FACES
PAIN_FUNCTIONAL_ASSESSMENT: WONG-BAKER FACES
PAIN_FUNCTIONAL_ASSESSMENT: 0-10
PAIN_FUNCTIONAL_ASSESSMENT: WONG-BAKER FACES
PAIN_FUNCTIONAL_ASSESSMENT: 0-10
PAIN_FUNCTIONAL_ASSESSMENT: WONG-BAKER FACES
PAIN_FUNCTIONAL_ASSESSMENT: 0-10

## 2025-08-28 ASSESSMENT — PAIN DESCRIPTION - DESCRIPTORS
DESCRIPTORS: ACHING

## 2025-08-28 ASSESSMENT — PATIENT HEALTH QUESTIONNAIRE - PHQ9
1. LITTLE INTEREST OR PLEASURE IN DOING THINGS: NOT AT ALL
2. FEELING DOWN, DEPRESSED OR HOPELESS: NOT AT ALL
SUM OF ALL RESPONSES TO PHQ9 QUESTIONS 1 & 2: 0

## 2025-08-28 ASSESSMENT — PAIN DESCRIPTION - ORIENTATION: ORIENTATION: LEFT

## 2025-08-28 ASSESSMENT — PAIN DESCRIPTION - LOCATION: LOCATION: KNEE

## 2025-08-29 ENCOUNTER — DOCUMENTATION (OUTPATIENT)
Dept: HOME HEALTH SERVICES | Facility: HOME HEALTH | Age: 50
End: 2025-08-29
Payer: COMMERCIAL

## 2025-08-29 ASSESSMENT — COGNITIVE AND FUNCTIONAL STATUS - GENERAL
MOBILITY SCORE: 24
MOBILITY SCORE: 24
DAILY ACTIVITIY SCORE: 24

## 2025-08-29 ASSESSMENT — PAIN DESCRIPTION - ORIENTATION
ORIENTATION: LEFT
ORIENTATION: LEFT

## 2025-08-29 ASSESSMENT — PAIN SCALES - GENERAL
PAINLEVEL_OUTOF10: 4
PAINLEVEL_OUTOF10: 3
PAINLEVEL_OUTOF10: 6
PAINLEVEL_OUTOF10: 3
PAINLEVEL_OUTOF10: 6
PAINLEVEL_OUTOF10: 4

## 2025-08-29 ASSESSMENT — PAIN DESCRIPTION - LOCATION
LOCATION: KNEE
LOCATION: KNEE

## 2025-08-29 ASSESSMENT — ACTIVITIES OF DAILY LIVING (ADL)
ADL_ASSISTANCE: INDEPENDENT
ADLS_ADDRESSED: YES

## 2025-08-29 ASSESSMENT — PAIN - FUNCTIONAL ASSESSMENT
PAIN_FUNCTIONAL_ASSESSMENT: 0-10

## 2025-08-29 ASSESSMENT — PAIN DESCRIPTION - DESCRIPTORS
DESCRIPTORS: ACHING

## 2025-08-30 ENCOUNTER — HOME CARE VISIT (OUTPATIENT)
Dept: HOME HEALTH SERVICES | Facility: HOME HEALTH | Age: 50
End: 2025-08-30
Payer: COMMERCIAL

## 2025-08-30 VITALS
RESPIRATION RATE: 16 BRPM | HEART RATE: 97 BPM | SYSTOLIC BLOOD PRESSURE: 135 MMHG | OXYGEN SATURATION: 95 % | DIASTOLIC BLOOD PRESSURE: 90 MMHG

## 2025-08-30 PROCEDURE — G0151 HHCP-SERV OF PT,EA 15 MIN: HCPCS

## 2025-08-30 SDOH — HEALTH STABILITY: PHYSICAL HEALTH
EXERCISE COMMENTS: INITIATED TKA PROTOCOL EX AND STRETCHES.   MARCH OPERATED LEG WITH WALKER AMBULATION FOR GRAVITY ASSISTED KNEE FLEXION AND PROMOTE HIP FLEXOR STRENGTH.  STEP LUNGE STRETCH DEMONSTRATED, NOT PERFORMED DUE TO WOUND VAC.

## 2025-08-30 ASSESSMENT — ENCOUNTER SYMPTOMS
LIMITED RANGE OF MOTION: 1
HYPERTENSION: 1
HIGHEST PAIN SEVERITY IN PAST 24 HOURS: 9/10
PAIN: 1
SLEEP QUALITY: ADEQUATE
PAIN LOCATION - PAIN SEVERITY: 3/10
PERSON REPORTING PAIN: PATIENT
PAIN SEVERITY GOAL: 2/10
ANGER WITHIN DEFINED LIMITS: 1
SUBJECTIVE PAIN PROGRESSION: WAXING AND WANING
LOWEST PAIN SEVERITY IN PAST 24 HOURS: 3/10
AGGRESSION WITHIN DEFINED LIMITS: 1
PAIN LOCATION: LEFT KNEE

## 2025-08-30 ASSESSMENT — ACTIVITIES OF DAILY LIVING (ADL)
ENTERING_EXITING_HOME: MINIMUM ASSIST
AMBULATION ASSISTANCE: STAND BY ASSIST
PHYSICAL TRANSFERS ASSESSED: 1
OASIS_M1830: 05
CURRENT_FUNCTION: STAND BY ASSIST
AMBULATION ASSISTANCE: 1
AMBULATION ASSISTANCE ON FLAT SURFACES: 1
AMBULATION_DISTANCE/DURATION_TOLERATED: 45FT

## 2025-09-02 DIAGNOSIS — Z96.649 S/P TOTAL HIP RESURFACING: Primary | ICD-10-CM

## 2025-09-03 ENCOUNTER — HOME CARE VISIT (OUTPATIENT)
Dept: HOME HEALTH SERVICES | Facility: HOME HEALTH | Age: 50
End: 2025-09-03
Payer: COMMERCIAL

## 2025-09-03 VITALS
OXYGEN SATURATION: 98 % | SYSTOLIC BLOOD PRESSURE: 116 MMHG | HEART RATE: 76 BPM | DIASTOLIC BLOOD PRESSURE: 82 MMHG | TEMPERATURE: 98.8 F

## 2025-09-03 PROCEDURE — G0151 HHCP-SERV OF PT,EA 15 MIN: HCPCS

## 2025-09-03 ASSESSMENT — ENCOUNTER SYMPTOMS
PAIN LOCATION: LEFT KNEE
PERSON REPORTING PAIN: PATIENT
PAIN: 1
PAIN LOCATION - PAIN SEVERITY: 4/10
LOWEST PAIN SEVERITY IN PAST 24 HOURS: 2/10
HIGHEST PAIN SEVERITY IN PAST 24 HOURS: 5/10
SUBJECTIVE PAIN PROGRESSION: GRADUALLY IMPROVING

## 2025-09-05 ENCOUNTER — HOME CARE VISIT (OUTPATIENT)
Dept: HOME HEALTH SERVICES | Facility: HOME HEALTH | Age: 50
End: 2025-09-05
Payer: COMMERCIAL

## 2025-09-05 VITALS
SYSTOLIC BLOOD PRESSURE: 130 MMHG | DIASTOLIC BLOOD PRESSURE: 84 MMHG | TEMPERATURE: 98.4 F | HEART RATE: 74 BPM | OXYGEN SATURATION: 97 %

## 2025-09-05 LAB
COBALT SERPL-MCNC: 5.3 MCG/L (ref 0.1–0.4)
CR SERPL-MCNC: 3.1 MCG/L

## 2025-09-05 PROCEDURE — G0151 HHCP-SERV OF PT,EA 15 MIN: HCPCS

## 2025-09-05 ASSESSMENT — ENCOUNTER SYMPTOMS
HIGHEST PAIN SEVERITY IN PAST 24 HOURS: 6/10
PAIN LOCATION - PAIN SEVERITY: 2/10
PERSON REPORTING PAIN: PATIENT
PAIN: 1
LOWEST PAIN SEVERITY IN PAST 24 HOURS: 1/10
PAIN LOCATION: LEFT KNEE

## 2025-09-23 ENCOUNTER — APPOINTMENT (OUTPATIENT)
Dept: ORTHOPEDIC SURGERY | Facility: CLINIC | Age: 50
End: 2025-09-23
Payer: COMMERCIAL

## 2026-08-25 ENCOUNTER — APPOINTMENT (OUTPATIENT)
Dept: ORTHOPEDIC SURGERY | Facility: CLINIC | Age: 51
End: 2026-08-25
Payer: COMMERCIAL

## (undated) DEVICE — GLOVE, SURGICAL, PROTEXIS PI MICRO, 6.5, PF, LF

## (undated) DEVICE — GLOVE, SURGICAL, PROTEXIS PI ORTHO, 8.0, PF, LF

## (undated) DEVICE — CUFF, TOURNIQUET, 30 X 4, DUAL PORT/SNGL BLADDER, DISP, LF

## (undated) DEVICE — GLOVE, SURGICAL, PROTEXIS PI MICRO, 8.0, PF, LF

## (undated) DEVICE — SUTURE, STRATAFIX PDS PLUS, 1, OS-6, SYMMETRIC 60CM

## (undated) DEVICE — HIGH FLOW TIP FOR INTERPULSE HANDPIECE SET

## (undated) DEVICE — ADHESIVE, SKIN, DERMABOND ADVANCED, 15CM, PEN-STYLE

## (undated) DEVICE — BLADE, SAW, RECIPROCATING, DOUBLE-SIDED, 70 X 12.5 X 1 MM, STAINLESS STEEL

## (undated) DEVICE — HOOD, SURGICAL, FLYTE SURGICOOL

## (undated) DEVICE — STRIP, SKIN CLOSURE, STERI STRIP, REINFORCED, 0.5 X 4 IN

## (undated) DEVICE — PAD, KNEE STABILIZER, REPLACEMENT KRAAY

## (undated) DEVICE — MIXER, CEMENT, MIXEVAC III HIGH VACUUM KIT, STERILE

## (undated) DEVICE — SUTURE, VICRYL, 0, 27 IN, CT-1, UNDYED

## (undated) DEVICE — SUTURE, ETHIBOND, P2, V-37, 30 IN, GREEN

## (undated) DEVICE — DRAPE, ISOLATION, ANTIMICROBIAL, W/POUCH, IOBAN 2, STERI DRAPE, 125 X 83 IN, DISPOSABLE, STERILE

## (undated) DEVICE — SUTURE, CTD, VICRYL, 2-0, UND, BR, CT-2

## (undated) DEVICE — GLOVE, SURGICAL, PROTEXIS PI BLUE W/NEUTHERA, 8.0, PF, LF

## (undated) DEVICE — SUTURE, STRATAFIX, SPIRAL MONOCRYL PLUS, 3-0, PS-1 45CM, UNDYED

## (undated) DEVICE — Device

## (undated) DEVICE — SYRINGE, 50 CC, LUER LOCK

## (undated) DEVICE — WOUND SYSTEM, DEBRIDEMENT & CLEANING, O.R DUOPAK

## (undated) DEVICE — GLOVE, SURGICAL, PROTEXIS PI BLUE W/NEUTHERA, 7.0, PF, LF

## (undated) DEVICE — BLADE, SAW, SAGITTAL, DUAL CUT, 18 X 90 X 1.27

## (undated) DEVICE — MARKER, SKIN, DUAL TIP INK W/9 LABEL AND REMOVABLE TIME OUT SLEEVE

## (undated) DEVICE — NEEDLE, SPINAL, 20 G X 3.5 IN, YELLOW HUB

## (undated) DEVICE — TOWEL, SURGICAL, NEURO, O/R, 16 X 26, BLUE, STERILE